# Patient Record
Sex: FEMALE | Race: BLACK OR AFRICAN AMERICAN | Employment: PART TIME | URBAN - METROPOLITAN AREA
[De-identification: names, ages, dates, MRNs, and addresses within clinical notes are randomized per-mention and may not be internally consistent; named-entity substitution may affect disease eponyms.]

---

## 2017-03-16 ENCOUNTER — OFFICE VISIT (OUTPATIENT)
Dept: INTERNAL MEDICINE CLINIC | Age: 32
End: 2017-03-16

## 2017-03-16 VITALS
HEIGHT: 64 IN | HEART RATE: 74 BPM | RESPIRATION RATE: 18 BRPM | BODY MASS INDEX: 19.7 KG/M2 | OXYGEN SATURATION: 97 % | TEMPERATURE: 99.3 F | WEIGHT: 115.4 LBS | SYSTOLIC BLOOD PRESSURE: 101 MMHG | DIASTOLIC BLOOD PRESSURE: 73 MMHG

## 2017-03-16 DIAGNOSIS — N89.8 VAGINAL CYST: ICD-10-CM

## 2017-03-16 DIAGNOSIS — R10.11 RUQ ABDOMINAL PAIN: Primary | ICD-10-CM

## 2017-03-16 DIAGNOSIS — K21.9 GASTROESOPHAGEAL REFLUX DISEASE WITHOUT ESOPHAGITIS: ICD-10-CM

## 2017-03-16 LAB
BILIRUB UR QL STRIP: NEGATIVE
GLUCOSE UR-MCNC: NEGATIVE MG/DL
KETONES P FAST UR STRIP-MCNC: NEGATIVE MG/DL
PH UR STRIP: 7 [PH] (ref 4.6–8)
PROT UR QL STRIP: NORMAL MG/DL
SP GR UR STRIP: 1.02 (ref 1–1.03)
UA UROBILINOGEN AMB POC: NORMAL (ref 0.2–1)
URINALYSIS CLARITY POC: CLEAR
URINALYSIS COLOR POC: YELLOW
URINE BLOOD POC: NORMAL
URINE LEUKOCYTES POC: NORMAL
URINE NITRITES POC: NEGATIVE

## 2017-03-16 RX ORDER — TRAMADOL HYDROCHLORIDE 50 MG/1
TABLET ORAL
Refills: 0 | COMMUNITY
Start: 2017-03-05 | End: 2017-03-16 | Stop reason: ALTCHOICE

## 2017-03-16 RX ORDER — ACETAMINOPHEN AND CODEINE PHOSPHATE 300; 30 MG/1; MG/1
1 TABLET ORAL
Qty: 15 TAB | Refills: 0 | Status: SHIPPED | OUTPATIENT
Start: 2017-03-16 | End: 2017-04-07

## 2017-03-16 RX ORDER — IBUPROFEN 600 MG/1
TABLET ORAL
COMMUNITY
End: 2017-03-28 | Stop reason: SDUPTHER

## 2017-03-16 RX ORDER — CEPHALEXIN 500 MG/1
500 CAPSULE ORAL 4 TIMES DAILY
COMMUNITY
End: 2017-03-28

## 2017-03-16 RX ORDER — OMEPRAZOLE 20 MG/1
20 CAPSULE, DELAYED RELEASE ORAL DAILY
Qty: 14 CAP | Refills: 0 | Status: SHIPPED | OUTPATIENT
Start: 2017-03-16 | End: 2017-03-28 | Stop reason: SDUPTHER

## 2017-03-16 NOTE — PROGRESS NOTES
1. Have you been to the ER, urgent care clinic since your last visit? Hospitalized since your last visit? Yes When: 3/4/17 [facility] for kidney pain 3/11/17 [facility] Kidney pain    2. Have you seen or consulted any other health care providers outside of the [address] [place] [address] since your last visit? Include any pap smears or colon screening. Yes Reason for visit: [facility] for   Chief Complaint   Patient presents with    New Patient     pt is here for establish care    Referral Request     pt states she would like a referral to GYN    Cyst     pt states that she has a cyst on the inside of the vagina, no pain noted but its concerns her. Pt states pain level is a 5 right rib area. ..

## 2017-03-16 NOTE — MR AVS SNAPSHOT
Visit Information Date & Time Provider Department Dept. Phone Encounter #  
 3/16/2017  3:00 PM Ramirez Rincon NP [address] [facility] [phone] [id number] Follow-up Instructions Return in about 2 weeks (around 3/30/2017) for 2 wk f/u. Upcoming Health Maintenance Date Due  
 PAP AKA CERVICAL CYTOLOGY 2/3/2007 DTaP/Tdap/Td series (2 - Td) 3/16/2027 Allergies as of 3/16/2017  Review Complete On: 3/16/2017 By: Monica Nava. HANNAH Newby Severity Noted Reaction Type Reactions Soap  01/03/2013    Hives Current Immunizations  Reviewed on 2/29/2016 No immunizations on file. Not reviewed this visit You Were Diagnosed With   
  
 Codes Comments RUQ abdominal pain    -  Primary ICD-10-CM: R10.11 ICD-9-CM: 789.01 Vaginal cyst     ICD-10-CM: N89.8 ICD-9-CM: 623.8 Gastroesophageal reflux disease without esophagitis     ICD-10-CM: K21.9 ICD-9-CM: 530.81 Vitals BP Pulse Temp Resp Height(growth percentile) Weight(growth percentile) 101/73 (BP 1 Location: Left arm, BP Patient Position: Sitting) 74 99.3 °F (37.4 °C) (Oral) 18 5' 4\" (1.626 m) 115 lb 6.4 oz (52.3 kg) LMP SpO2 BMI OB Status Smoking Status 02/17/2017 (Exact Date) 97% 19.81 kg/m2 Having regular periods Current Every Day Smoker Vitals History BMI and BSA Data Body Mass Index Body Surface Area  
 19.81 kg/m 2 1.54 m 2 Preferred Pharmacy Pharmacy Name Phone Braulio Allen [address] , [address] [facility] [phone] Your Updated Medication List  
  
   
This list is accurate as of: 3/16/17  4:01 PM.  Always use your most recent med list.  
  
  
  
  
 acetaminophen-codeine 300-30 mg per tablet Commonly known as:  TYLENOL #3 Take 1 Tab by mouth every six (6) hours as needed for Pain. Max Daily Amount: 4 Tabs. cephALEXin 500 mg capsule Commonly known as:  Gato Panning Take 500 mg by mouth four (4) times daily. ibuprofen 600 mg tablet Commonly known as:  MOTRIN Take  by mouth every six (6) hours as needed for Pain. omeprazole 20 mg capsule Commonly known as:  PRILOSEC Take 1 Cap by mouth daily. Prescriptions Printed Refills  
 acetaminophen-codeine (TYLENOL #3) 300-30 mg per tablet 0 Sig: Take 1 Tab by mouth every six (6) hours as needed for Pain. Max Daily Amount: 4 Tabs. Class: Print Route: Oral  
  
Prescriptions Sent to Pharmacy Refills  
 omeprazole (PRILOSEC) 20 mg capsule 0 Sig: Take 1 Cap by mouth daily. Class: Normal  
 Pharmacy: [facility] [address] [place] [address], [address] [place] [address] #: [phone] Route: Oral  
  
We Performed the Following AMB POC URINALYSIS DIP STICK AUTO W/O MICRO [73518 CPT(R)] REFERRAL TO GASTROENTEROLOGY [KDD96 Custom] Comments:  
 Please evaluate patient for RUQ pain, wt loss, nausea, and decreased appetite. REFERRAL TO GYNECOLOGY [REF30 Custom] Follow-up Instructions Return in about 2 weeks (around 3/30/2017) for 2 wk f/u. To-Do List   
 03/16/2017 Imaging:  CT ABD PELV W CONT Referral Information Referral ID Referred By Referred To  
  
 [id number] NIKOS Augustin MD   
   [address] Phone: [phone] Fax: [phone] Visits Status Start Date End Date 1 New Request 3/16/17 3/16/18 If your referral has a status of pending review or denied, additional information will be sent to support the outcome of this decision. Referral ID Referred By Referred To  
 [id number] Anoop Elizabeth I Not Available Visits Status Start Date End Date 1 New Request 3/16/17 3/16/18 If your referral has a status of pending review or denied, additional information will be sent to support the outcome of this decision. Referral ID Referred By Referred To [id number] [facility] [address] [place] [phone] Emily Bains6 [employer] Visits Status Start Date End Date 1 New Request 3/16/17 3/16/18 If your referral has a status of pending review or denied, additional information will be sent to support the outcome of this decision. Patient Instructions Learning About Benefits From Quitting Smoking How does quitting smoking make you healthier? If you're thinking about quitting smoking, you may have a few reasons to be smoke-free. Your health may be one of them. · When you quit smoking, you lower your risks for cancer, lung disease, heart attack, stroke, blood vessel disease, and blindness from macular degeneration. · When you're smoke-free, you get sick less often, and you heal faster. You are less likely to get colds, flu, bronchitis, and pneumonia. · As a nonsmoker, you may find that your mood is better and you are less stressed. When and how will you feel healthier? Quitting has real health benefits that start from day 1 of being smoke-free. And the longer you stay smoke-free, the healthier you get and the better you feel. The first hours · After just 20 minutes, your blood pressure and heart rate go down. That means there's less stress on your heart and blood vessels. · Within 12 hours, the level of carbon monoxide in your blood drops back to normal. That makes room for more oxygen. With more oxygen in your body, you may notice that you have more energy than when you smoked. After 2 weeks · Your lungs start to work better. · Your risk of heart attack starts to drop. After 1 month · When your lungs are clear, you cough less and breathe deeper, so it's easier to be active. · Your sense of taste and smell return. That means you can enjoy food more than you have since you started smoking. Over the years · After 1 year, your risk of heart disease is half what it would be if you kept smoking. · After 5 years, your risk of stroke starts to shrink. Within a few years after that, it's about the same as if you'd never smoked. · After 10 years, your risk of dying from lung cancer is cut by about half. And your risk for many other types of cancer is lower too. How would quitting help others in your life? When you quit smoking, you improve the health of everyone who now breathes in your smoke. · Their heart, lung, and cancer risks drop, much like yours. · They are sick less. For babies and small children, living smoke-free means they're less likely to have ear infections, pneumonia, and bronchitis. · If you're a woman who is or will be pregnant someday, quitting smoking means a healthier . · Children who are close to you are less likely to become adult smokers. Where can you learn more? Go to http://rl99times.cnlavon.info/. Enter [id number] [id number] [id number] 11 in the search box to learn more about \"Learning About Benefits From Quitting Smoking. \" Current as of: May 26, 2016 Content Version: 11.1 © 9551-5300 [employer]. Care instructions adapted under license by [employer] (which disclaims liability or warranty for this information). If you have questions about a medical condition or this instruction, always ask your healthcare professional. [place] [address] any warranty or liability for your use of this information. Learning About Benefits From Quitting Smoking How does quitting smoking make you healthier? If you're thinking about quitting smoking, you may have a few reasons to be smoke-free. Your health may be one of them. · When you quit smoking, you lower your risks for cancer, lung disease, heart attack, stroke, blood vessel disease, and blindness from macular degeneration. · When you're smoke-free, you get sick less often, and you heal faster. You are less likely to get colds, flu, bronchitis, and pneumonia. · As a nonsmoker, you may find that your mood is better and you are less stressed. When and how will you feel healthier? Quitting has real health benefits that start from day 1 of being smoke-free. And the longer you stay smoke-free, the healthier you get and the better you feel. The first hours · After just 20 minutes, your blood pressure and heart rate go down. That means there's less stress on your heart and blood vessels. · Within 12 hours, the level of carbon monoxide in your blood drops back to normal. That makes room for more oxygen. With more oxygen in your body, you may notice that you have more energy than when you smoked. After 2 weeks · Your lungs start to work better. · Your risk of heart attack starts to drop. After 1 month · When your lungs are clear, you cough less and breathe deeper, so it's easier to be active. · Your sense of taste and smell return. That means you can enjoy food more than you have since you started smoking. Over the years · After 1 year, your risk of heart disease is half what it would be if you kept smoking. · After 5 years, your risk of stroke starts to shrink. Within a few years after that, it's about the same as if you'd never smoked. · After 10 years, your risk of dying from lung cancer is cut by about half. And your risk for many other types of cancer is lower too. How would quitting help others in your life? When you quit smoking, you improve the health of everyone who now breathes in your smoke. · Their heart, lung, and cancer risks drop, much like yours. · They are sick less. For babies and small children, living smoke-free means they're less likely to have ear infections, pneumonia, and bronchitis. · If you're a woman who is or will be pregnant someday, quitting smoking means a healthier . · Children who are close to you are less likely to become adult smokers. Where can you learn more? Go to http://rl-lavon.info/. Enter [id number] in the search box to learn more about \"Learning About Benefits From Quitting Smoking. \" Current as of: May 26, 2016 Content Version: 11.1 © 8753-2933 [employer]. Care instructions adapted under license by [employer] (which disclaims liability or warranty for this information). If you have questions about a medical condition or this instruction, always ask your healthcare professional. [place] [address] any warranty or liability for your use of this information. Stopping Smoking: Care Instructions Your Care Instructions Cigarette smokers crave the nicotine in cigarettes. Giving it up is much harder than simply changing a habit. Your body has to stop craving the nicotine. It is hard to quit, but you can do it. There are many tools that people use to quit smoking. You may find that combining tools works best for you. There are several steps to quitting. First you get ready to quit. Then you get support to help you. After that, you learn new skills and behaviors to become a nonsmoker. For many people, a necessary step is getting and using medicine. Your doctor will help you set up the plan that best meets your needs. You may want to attend a smoking cessation program to help you quit smoking. When you choose a program, look for one that has proven success. Ask your doctor for ideas. You will greatly increase your chances of success if you take medicine as well as get counseling or join a cessation program. 
Some of the changes you feel when you first quit tobacco are uncomfortable. Your body will miss the nicotine at first, and you may feel short-tempered and grumpy. You may have trouble sleeping or concentrating. Medicine can help you deal with these symptoms. You may struggle with changing your smoking habits and rituals. The last step is the tricky one: Be prepared for the smoking urge to continue for a time.  This is a lot to deal with, but keep at it. You will feel better. Follow-up care is a key part of your treatment and safety. Be sure to make and go to all appointments, and call your doctor if you are having problems. Its also a good idea to know your test results and keep a list of the medicines you take. How can you care for yourself at home? · Ask your family, friends, and coworkers for support. You have a better chance of quitting if you have help and support. · Join a support group, such as Nicotine Anonymous, for people who are trying to quit smoking. · Consider signing up for a smoking cessation program, such as the American Lung Association's Freedom from Smoking program. 
· Set a quit date. Pick your date carefully so that it is not right in the middle of a big deadline or stressful time. Once you quit, do not even take a puff. Get rid of all ashtrays and lighters after your last cigarette. Clean your house and your clothes so that they do not smell of smoke. · Learn how to be a nonsmoker. Think about ways you can avoid those things that make you reach for a cigarette. ¨ Avoid situations that put you at greatest risk for smoking. For some people, it is hard to have a drink with friends without smoking. For others, they might skip a coffee break with coworkers who smoke. ¨ Change your daily routine. Take a different route to work or eat a meal in a different place. · Cut down on stress. Calm yourself or release tension by doing an activity you enjoy, such as reading a book, taking a hot bath, or gardening. · Talk to your doctor or pharmacist about nicotine replacement therapy, which replaces the nicotine in your body. You still get nicotine but you do not use tobacco. Nicotine replacement products help you slowly reduce the amount of nicotine you need. These products come in several forms, many of them available over-the-counter: ¨ Nicotine patches ¨ Nicotine gum and lozenges ¨ Nicotine inhaler · Ask your doctor about bupropion (Wellbutrin) or varenicline (Chantix), which are prescription medicines. They do not contain nicotine. They help you by reducing withdrawal symptoms, such as stress and anxiety. · Some people find hypnosis, acupuncture, and massage helpful for ending the smoking habit. · Eat a healthy diet and get regular exercise. Having healthy habits will help your body move past its craving for nicotine. · Be prepared to keep trying. Most people are not successful the first few times they try to quit. Do not get mad at yourself if you smoke again. Make a list of things you learned and think about when you want to try again, such as next week, next month, or next year. Where can you learn more? Go to http://rlGetablelavon.info/. Enter Z977 in the search box to learn more about \"Stopping Smoking: Care Instructions. \" Current as of: May 26, 2016 Content Version: 11.1 © 3917-8157 [employer]ooki. Care instructions adapted under license by [employer] (which disclaims liability or warranty for this information). If you have questions about a medical condition or this instruction, always ask your healthcare professional. [address] any warranty or liability for your use of this information. Deciding About Using Medicines To Quit Smoking What are the medicines you can use? Your doctor may prescribe varenicline (Chantix) or bupropion (Zyban). These medicines can help you cope with cravings for tobacco. They are pills that don't contain nicotine. You also can use nicotine replacement products. These do contain nicotine. There are many types. · Gum and lozenges slowly release nicotine into your mouth. · Patches stick to your skin. They slowly release nicotine into your bloodstream. 
· An inhaler has a shaw that contains nicotine. You breathe in a puff of nicotine vapor through your mouth and throat. · Nasal spray releases a mist that contains nicotine. What are key points about this decision? · Using medicines can double your chances of quitting smoking. They can ease cravings and withdrawal symptoms. · Getting counseling along with using medicine can raise your chances of quitting even more. · If you smoke fewer than 5 cigarettes a day, you may not need medicines to help you quit smoking. · These medicines have less nicotine than cigarettes. And by itself, nicotine is not nearly as harmful as smoking. The tars, carbon monoxide, and other toxic chemicals in tobacco cause the harmful effects. · The side effects of nicotine replacement products depend on the type of product. For example, a patch can make your skin red and itchy. Medicines in pill form can make you sick to your stomach. They can also cause dry mouth and trouble sleeping. For most people, the side effects are not bad enough to make them stop using the products. · FDA warning. The U.S. Food and Drug Administration (FDA) warns that people who are taking bupropion or varenicline and who have any serious or unusual changes in mood or behavior or who feel like hurting themselves or someone else should stop taking the medicine and call a doctor right away. If you already have a mood or behavior problem, be sure to tell your doctor before you decide to use these medicines. Why might you choose to use medicines to quit smoking? · You have tried on your own to stop smoking, but you were not able to stop. · You smoke more than 5 cigarettes a day. · You want to increase your chances of quitting smoking. · You want to reduce your cravings and withdrawal symptoms. · You feel the benefits of medicine outweigh the side effects. Why might you choose not to use medicine? · You want to try quitting on your own by stopping all at once (\"cold turkey\"). · You want to cut back slowly on the number of cigarettes you smoke. · You smoke fewer than 5 cigarettes a day. · You do not like using medicine. · You feel the side effects of medicines outweigh the benefits. · You are worried about the cost of medicines. Your decision Thinking about the facts and your feelings can help you make a decision that is right for you. Be sure you understand the benefits and risks of your options, and think about what else you need to do before you make the decision. Where can you learn more? Go to http://rl-lavon.info/. Enter C861 in the search box to learn more about \"Deciding About Using Medicines To Quit Smoking. \" Current as of: May 26, 2016 Content Version: 11.1 © 6807-9469 [employer]. Care instructions adapted under license by [employer] (which disclaims liability or warranty for this information). If you have questions about a medical condition or this instruction, always ask your healthcare professional. [place] [address] any warranty or liability for your use of this information. HIDA Scan: About This Test 
What is it? A HIDA scan is an imaging test that checks how your gallbladder is working. The gallbladder is a small sac under your liver. It stores bile, a fluid that helps your body digest fats. If there are problems with the gallbladder, such as gallstones, the gallbladder may not store or empty bile properly. During a HIDA scan, a camera takes pictures of your gallbladder after a radioactive tracer is injected into a vein in your arm. The tracer travels through your liver, gallbladder, bile ducts, and small intestine. The camera takes a series of pictures of the tracer as it moves along. Your doctor can use these pictures to look for leaks, blockages, or any other problems. Why is this test done? The HIDA scan may be done to: 
· Help find the cause of pain in the upper belly, especially if the pain is on the right side. · Find out if bile is leaking. · Find anything that may be blocking the bile ducts. A HIDA scan is sometimes done if an earlier ultrasound test did not give enough information. How can you prepare for the test? 
· Before the HIDA scan, tell your doctor if: 
Sanjay Barajas You are or might be pregnant. ¨ You are breastfeeding. Do not breastfeed your baby for 2 days after this test. During this time, you can give your baby breast milk you stored before the test, or you can give formula. Discard the breast milk you pump for 2 days after the test. 
Areatha Mealing taking certain medicines like morphine for pain. ¨ Within the past 4 days, you've had an X-ray test that used barium. · The doctor may tell you not to eat or drink anything but water for 4 to 6 hours before the test. Follow all instructions carefully. If you haven't eaten for more than 24 hours before the test, tell your doctor. What happens during the test? 
· You will remove any clothing around your belly. You will be given a gown or paper covering to use during the test. 
· You will lie on your back on a table. · A thin tube, call an IV, will be put into a vein in your arm. · A radioactive tracer chemical will be injected into the IV. A medicine that stimulates your gallbladder may also be injected. · The scanning camera will be placed close over your belly. · A picture will be taken right away. The whole scan may last up to 60 minutes as the tracer passes through your liver and into your gallbladder and small intestine. Several more pictures, each lasting a few minutes, may be taken over the next 2 to 4 hours. Each picture will take only a few minutes, but you will have to lie still for the whole test. 
What else should you know about the test? 
· The HIDA scan itself is painless, but you may feel a brief sting or pinch as the IV is placed in your arm. · You may feel a brief pain in your belly as the medicine that stimulates your gallbladder starts to work. · The amount of radiation in the tracer chemical is very small. It is generally not harmful to health, and it's not a risk to people who touch you after the test. But there is a slight risk of damage to cells or tissue from being exposed to any radiation. The camera itself does not produce any radiation. What happens after the test? 
· You will probably be able to go home right away. · Most of the tracer will leave your body within 24 hours through your urine and stool. When you go to the bathroom during that time, be sure to flush the toilet and wash your hands well with soap and water. · Your doctor will discuss the results of the test with you. · You can go back to your usual activities right away. · If you are breastfeeding, you will need to use saved breast milk or formula for 2 days after the test. This is so you won't pass the tracer to your baby. Follow-up care is a key part of your treatment and safety. Be sure to make and go to all appointments, and call your doctor if you are having problems. It's also a good idea to keep a list of the medicines you take. Ask your doctor when you can expect to have your test results. Where can you learn more? Go to http://rl-lavon.info/. Enter F573 in the search box to learn more about \"HIDA Scan: About This Test.\" Current as of: August 9, 2016 Content Version: 11.1 © 6048-8941 Dash. Care instructions adapted under license by [employer] (which disclaims liability or warranty for this information). If you have questions about a medical condition or this instruction, always ask your healthcare professional. [address] any warranty or liability for your use of this information. CT Scan of the Abdomen: About This Test 
What is it? A CT (computed tomography) scan uses X-rays to make detailed pictures of your body and structures inside your body.  A CT scan of the abdomen (belly) can give your doctor information about your liver, pancreas, kidneys, and other structures in your belly. During the test, you will lie on a table that is attached to the [employer]. The CT scanner is a large doughnut-shaped machine. Why is this test done? A CT scan of the belly can help find problems such as kidney stones, infected pouches in the colon (diverticulitis), and appendicitis. It also helps find tumors and abscesses. How can you prepare for the test? 
Talk to your doctor about all your health conditions before the test. For example, tell your doctor if: 
· You are or might be pregnant. · You are allergic to any medicines. · You have diabetes. · You take metformin. · You are breastfeeding. · You get nervous in confined spaces. You may need medicine to help you relax. · You have had an X-ray test using barium contrast material in the past 4 days. You may be asked to not eat any solid foods starting the night before your scan. What happens before the test? 
· You may have to take off jewelry. · You will take off all or most of your clothes and change into a gown. If you do leave some clothes on, make sure you take everything out of your pockets. · You may have contrast material (dye) put into your arm through a tube called an IV. Or, you may drink the contrast material or it may be put through a tube into your bladder or rectum. Contrast material helps doctors see specific organs, blood vessels, and most tumors. What happens during the test? 
· You will lie on a table that is attached to the CT scanner. · The table slides into the round opening of the scanner. The table will move during the scan. The scanner moves inside the doughnut-shaped casing around your body. · You will be asked to hold still during the scan. You may be asked to hold your breath for short periods.  
· You may be alone in the scanning room, but a technologist will be watching you through a window and talking with you during the test. 
What else should you know about the test? 
· A CT scan does not hurt. · If a dye is used, you may feel a quick sting or pinch when the IV is started. The dye may make you feel warm and flushed and give you a metallic taste in your mouth. Some people feel sick to their stomach or get a headache. · If you breastfeed and are concerned about whether the dye used in this test is safe, talk to your doctor. Most experts believe that very little dye passes into breast milk and even less is passed on to the baby. But if you prefer, you can store some of your breast milk ahead of time and use it for a day or two after the test. 
· The dose of radiation from a CT scanner may be higher than that from other X-ray tests. If you are concerned about the radiation risk, talk to your doctor. How long does the test take? · The test will take about 30 to 60 minutes. Most of this time is spent getting ready for the scan. The actual test only takes a few minutes. What happens after the test? 
· You will probably be able to go home right away. · You can go back to your usual activities right away. · Drink plenty of fluids for 24 hours after the test if dye was used, unless your doctor tells you not to. When should you call for help? Watch closely for changes in your health, and be sure to contact your doctor if you have any problems. Follow-up care is a key part of your treatment and safety. Be sure to make and go to all appointments, and call your doctor if you are having problems. It's also a good idea to keep a list of the medicines you take. Ask your doctor when you can expect to have your test results. Where can you learn more? Go to http://rl-lavon.info/. Enter D072 in the search box to learn more about \"CT Scan of the Abdomen: About This Test.\" Current as of: February 19, 2016 Content Version: 11.1 © 6745-5785 [employer]. Care instructions adapted under license by [employer] (which disclaims liability or warranty for this information). If you have questions about a medical condition or this instruction, always ask your healthcare professional. [address] any warranty or liability for your use of this information. Gastroesophageal Reflux Disease (GERD): Care Instructions Your Care Instructions Gastroesophageal reflux disease (GERD) is the backward flow of stomach acid into the esophagus. The esophagus is the tube that leads from your throat to your stomach. A one-way valve prevents the stomach acid from moving up into this tube. When you have GERD, this valve does not close tightly enough. If you have mild GERD symptoms including heartburn, you may be able to control the problem with antacids or over-the-counter medicine. Changing your diet, losing weight, and making other lifestyle changes can also help reduce symptoms. Follow-up care is a key part of your treatment and safety. Be sure to make and go to all appointments, and call your doctor if you are having problems. Its also a good idea to know your test results and keep a list of the medicines you take. How can you care for yourself at home? · Take your medicines exactly as prescribed. Call your doctor if you think you are having a problem with your medicine. · Your doctor may recommend over-the-counter medicine. For mild or occasional indigestion, antacids, such as Tums, Gaviscon, Mylanta, or Maalox, may help. Your doctor also may recommend over-the-counter acid reducers, such as Pepcid AC, Tagamet HB, Zantac 75, or Prilosec. Read and follow all instructions on the label. If you use these medicines often, talk with your doctor. · Change your eating habits. ¨ Its best to eat several small meals instead of two or three large meals. ¨ After you eat, wait 2 to 3 hours before you lie down. ¨ Chocolate, mint, and alcohol can make GERD worse. ¨ Spicy foods, foods that have a lot of acid (like tomatoes and oranges), and coffee can make GERD symptoms worse in some people. If your symptoms are worse after you eat a certain food, you may want to stop eating that food to see if your symptoms get better. · Do not smoke or chew tobacco. Smoking can make GERD worse. If you need help quitting, talk to your doctor about stop-smoking programs and medicines. These can increase your chances of quitting for good. · If you have GERD symptoms at night, raise the head of your bed 6 to 8 inches by putting the frame on blocks or placing a foam wedge under the head of your mattress. (Adding extra pillows does not work.) · Do not wear tight clothing around your middle. · Lose weight if you need to. Losing just 5 to 10 pounds can help. When should you call for help? Call your doctor now or seek immediate medical care if: 
· You have new or different belly pain. · Your stools are black and tarlike or have streaks of blood. Watch closely for changes in your health, and be sure to contact your doctor if: 
· Your symptoms have not improved after 2 days. · Food seems to catch in your throat or chest. 
Where can you learn more? Go to http://rl-lavon.info/. Enter N211 in the search box to learn more about \"Gastroesophageal Reflux Disease (GERD): Care Instructions. \" Current as of: August 9, 2016 Content Version: 11.1 © 1227-3391 [employer]. Care instructions adapted under license by [employer] (which disclaims liability or warranty for this information). If you have questions about a medical condition or this instruction, always ask your healthcare professional. [address] any warranty or liability for your use of this information. Introducing [facility]!    
 [facility] introduces [employer] patient portal. Now you can access parts of your medical record, email your doctor's office, and request medication refills online. 1. In your internet browser, go to [email or web address] 2. Click on the First Time User? Click Here link in the Sign In box. You will see the New Member Sign Up page. 3. Enter your [employer] Access Code exactly as it appears below. You will not need to use this code after youve completed the sign-up process. If you do not sign up before the expiration date, you must request a new code. · MyChart Access Code: [facility] Expires: 6/14/2017  3:11 PM 
 
4. Enter the last four digits of your Social Security Number (xxxx) and Date of Birth (mm/dd/yyyy) as indicated and click Submit. You will be taken to the next sign-up page. 5. Create a [employer] ID. This will be your [employer] login ID and cannot be changed, so think of one that is secure and easy to remember. 6. Create a [employer] password. You can change your password at any time. 7. Enter your Password Reset Question and Answer. This can be used at a later time if you forget your password. 8. Enter your e-mail address. You will receive e-mail notification when new information is available in [address] 9. Click Sign Up. You can now view and download portions of your medical record. 10. Click the Download Summary menu link to download a portable copy of your medical information. If you have questions, please visit the Frequently Asked Questions section of the [employer] website. Remember, [employer] is NOT to be used for urgent needs. For medical emergencies, dial 911. Now available from your iPhone and Android! Please provide this summary of care documentation to your next provider. Your primary care clinician is listed as NIKOS Ashley. If you have any questions after today's visit, please call [phone].

## 2017-03-16 NOTE — PATIENT INSTRUCTIONS
Learning About Benefits From Quitting Smoking  How does quitting smoking make you healthier? If you're thinking about quitting smoking, you may have a few reasons to be smoke-free. Your health may be one of them. · When you quit smoking, you lower your risks for cancer, lung disease, heart attack, stroke, blood vessel disease, and blindness from macular degeneration. · When you're smoke-free, you get sick less often, and you heal faster. You are less likely to get colds, flu, bronchitis, and pneumonia. · As a nonsmoker, you may find that your mood is better and you are less stressed. When and how will you feel healthier? Quitting has real health benefits that start from day 1 of being smoke-free. And the longer you stay smoke-free, the healthier you get and the better you feel. The first hours  · After just 20 minutes, your blood pressure and heart rate go down. That means there's less stress on your heart and blood vessels. · Within 12 hours, the level of carbon monoxide in your blood drops back to normal. That makes room for more oxygen. With more oxygen in your body, you may notice that you have more energy than when you smoked. After 2 weeks  · Your lungs start to work better. · Your risk of heart attack starts to drop. After 1 month  · When your lungs are clear, you cough less and breathe deeper, so it's easier to be active. · Your sense of taste and smell return. That means you can enjoy food more than you have since you started smoking. Over the years  · After 1 year, your risk of heart disease is half what it would be if you kept smoking. · After 5 years, your risk of stroke starts to shrink. Within a few years after that, it's about the same as if you'd never smoked. · After 10 years, your risk of dying from lung cancer is cut by about half. And your risk for many other types of cancer is lower too. How would quitting help others in your life?   When you quit smoking, you improve the health of everyone who now breathes in your smoke. · Their heart, lung, and cancer risks drop, much like yours. · They are sick less. For babies and small children, living smoke-free means they're less likely to have ear infections, pneumonia, and bronchitis. · If you're a woman who is or will be pregnant someday, quitting smoking means a healthier . · Children who are close to you are less likely to become adult smokers. Where can you learn more? Go to http://rl-lavon.info/. Enter [id number] [id number] [id number] in the search box to learn more about \"Learning About Benefits From Quitting Smoking. \"  Current as of: May 26, 2016  Content Version: 11.1  © 9967-4823 [employer]. Care instructions adapted under license by [employer] (which disclaims liability or warranty for this information). If you have questions about a medical condition or this instruction, always ask your healthcare professional. [place] [address] any warranty or liability for your use of this information. Learning About Benefits From Quitting Smoking  How does quitting smoking make you healthier? If you're thinking about quitting smoking, you may have a few reasons to be smoke-free. Your health may be one of them. · When you quit smoking, you lower your risks for cancer, lung disease, heart attack, stroke, blood vessel disease, and blindness from macular degeneration. · When you're smoke-free, you get sick less often, and you heal faster. You are less likely to get colds, flu, bronchitis, and pneumonia. · As a nonsmoker, you may find that your mood is better and you are less stressed. When and how will you feel healthier? Quitting has real health benefits that start from day 1 of being smoke-free. And the longer you stay smoke-free, the healthier you get and the better you feel. The first hours  · After just 20 minutes, your blood pressure and heart rate go down.  That means there's less stress on your heart and blood vessels. · Within 12 hours, the level of carbon monoxide in your blood drops back to normal. That makes room for more oxygen. With more oxygen in your body, you may notice that you have more energy than when you smoked. After 2 weeks  · Your lungs start to work better. · Your risk of heart attack starts to drop. After 1 month  · When your lungs are clear, you cough less and breathe deeper, so it's easier to be active. · Your sense of taste and smell return. That means you can enjoy food more than you have since you started smoking. Over the years  · After 1 year, your risk of heart disease is half what it would be if you kept smoking. · After 5 years, your risk of stroke starts to shrink. Within a few years after that, it's about the same as if you'd never smoked. · After 10 years, your risk of dying from lung cancer is cut by about half. And your risk for many other types of cancer is lower too. How would quitting help others in your life? When you quit smoking, you improve the health of everyone who now breathes in your smoke. · Their heart, lung, and cancer risks drop, much like yours. · They are sick less. For babies and small children, living smoke-free means they're less likely to have ear infections, pneumonia, and bronchitis. · If you're a woman who is or will be pregnant someday, quitting smoking means a healthier . · Children who are close to you are less likely to become adult smokers. Where can you learn more? Go to http://rl-lavon.info/. Enter [id number] [id number] [id number] in the search box to learn more about \"Learning About Benefits From Quitting Smoking. \"  Current as of: May 26, 2016  Content Version: 11.1  © 9787-7800 [employer]. Care instructions adapted under license by [employer] (which disclaims liability or warranty for this information).  If you have questions about a medical condition or this instruction, always ask your healthcare professional. [address] any warranty or liability for your use of this information. Stopping Smoking: Care Instructions  Your Care Instructions  Cigarette smokers crave the nicotine in cigarettes. Giving it up is much harder than simply changing a habit. Your body has to stop craving the nicotine. It is hard to quit, but you can do it. There are many tools that people use to quit smoking. You may find that combining tools works best for you. There are several steps to quitting. First you get ready to quit. Then you get support to help you. After that, you learn new skills and behaviors to become a nonsmoker. For many people, a necessary step is getting and using medicine. Your doctor will help you set up the plan that best meets your needs. You may want to attend a smoking cessation program to help you quit smoking. When you choose a program, look for one that has proven success. Ask your doctor for ideas. You will greatly increase your chances of success if you take medicine as well as get counseling or join a cessation program.  Some of the changes you feel when you first quit tobacco are uncomfortable. Your body will miss the nicotine at first, and you may feel short-tempered and grumpy. You may have trouble sleeping or concentrating. Medicine can help you deal with these symptoms. You may struggle with changing your smoking habits and rituals. The last step is the tricky one: Be prepared for the smoking urge to continue for a time. This is a lot to deal with, but keep at it. You will feel better. Follow-up care is a key part of your treatment and safety. Be sure to make and go to all appointments, and call your doctor if you are having problems. Its also a good idea to know your test results and keep a list of the medicines you take. How can you care for yourself at home? · Ask your family, friends, and coworkers for support.  You have a better chance of quitting if you have help and support. · Join a support group, such as Nicotine Anonymous, for people who are trying to quit smoking. · Consider signing up for a smoking cessation program, such as the American Lung Association's Freedom from Smoking program.  · Set a quit date. Pick your date carefully so that it is not right in the middle of a big deadline or stressful time. Once you quit, do not even take a puff. Get rid of all ashtrays and lighters after your last cigarette. Clean your house and your clothes so that they do not smell of smoke. · Learn how to be a nonsmoker. Think about ways you can avoid those things that make you reach for a cigarette. ¨ Avoid situations that put you at greatest risk for smoking. For some people, it is hard to have a drink with friends without smoking. For others, they might skip a coffee break with coworkers who smoke. ¨ Change your daily routine. Take a different route to work or eat a meal in a different place. · Cut down on stress. Calm yourself or release tension by doing an activity you enjoy, such as reading a book, taking a hot bath, or gardening. · Talk to your doctor or pharmacist about nicotine replacement therapy, which replaces the nicotine in your body. You still get nicotine but you do not use tobacco. Nicotine replacement products help you slowly reduce the amount of nicotine you need. These products come in several forms, many of them available over-the-counter:  ¨ Nicotine patches  ¨ Nicotine gum and lozenges  ¨ Nicotine inhaler  · Ask your doctor about bupropion (Wellbutrin) or varenicline (Chantix), which are prescription medicines. They do not contain nicotine. They help you by reducing withdrawal symptoms, such as stress and anxiety. · Some people find hypnosis, acupuncture, and massage helpful for ending the smoking habit. · Eat a healthy diet and get regular exercise.  Having healthy habits will help your body move past its craving for nicotine. · Be prepared to keep trying. Most people are not successful the first few times they try to quit. Do not get mad at yourself if you smoke again. Make a list of things you learned and think about when you want to try again, such as next week, next month, or next year. Where can you learn more? Go to http://rl-lavon.info/. Enter I760 in the search box to learn more about \"Stopping Smoking: Care Instructions. \"  Current as of: May 26, 2016  Content Version: 11.1  © 0583-6507 [employer]. Care instructions adapted under license by [employer] (which disclaims liability or warranty for this information). If you have questions about a medical condition or this instruction, always ask your healthcare professional. [address] any warranty or liability for your use of this information. Deciding About Using Medicines To Quit Smoking  What are the medicines you can use? Your doctor may prescribe varenicline (Chantix) or bupropion (Zyban). These medicines can help you cope with cravings for tobacco. They are pills that don't contain nicotine. You also can use nicotine replacement products. These do contain nicotine. There are many types. · Gum and lozenges slowly release nicotine into your mouth. · Patches stick to your skin. They slowly release nicotine into your bloodstream.  · An inhaler has a shaw that contains nicotine. You breathe in a puff of nicotine vapor through your mouth and throat. · Nasal spray releases a mist that contains nicotine. What are key points about this decision? · Using medicines can double your chances of quitting smoking. They can ease cravings and withdrawal symptoms. · Getting counseling along with using medicine can raise your chances of quitting even more. · If you smoke fewer than 5 cigarettes a day, you may not need medicines to help you quit smoking.   · These medicines have less nicotine than cigarettes. And by itself, nicotine is not nearly as harmful as smoking. The tars, carbon monoxide, and other toxic chemicals in tobacco cause the harmful effects. · The side effects of nicotine replacement products depend on the type of product. For example, a patch can make your skin red and itchy. Medicines in pill form can make you sick to your stomach. They can also cause dry mouth and trouble sleeping. For most people, the side effects are not bad enough to make them stop using the products. · FDA warning. The U.S. Food and Drug Administration (FDA) warns that people who are taking bupropion or varenicline and who have any serious or unusual changes in mood or behavior or who feel like hurting themselves or someone else should stop taking the medicine and call a doctor right away. If you already have a mood or behavior problem, be sure to tell your doctor before you decide to use these medicines. Why might you choose to use medicines to quit smoking? · You have tried on your own to stop smoking, but you were not able to stop. · You smoke more than 5 cigarettes a day. · You want to increase your chances of quitting smoking. · You want to reduce your cravings and withdrawal symptoms. · You feel the benefits of medicine outweigh the side effects. Why might you choose not to use medicine? · You want to try quitting on your own by stopping all at once (\"cold turkey\"). · You want to cut back slowly on the number of cigarettes you smoke. · You smoke fewer than 5 cigarettes a day. · You do not like using medicine. · You feel the side effects of medicines outweigh the benefits. · You are worried about the cost of medicines. Your decision  Thinking about the facts and your feelings can help you make a decision that is right for you. Be sure you understand the benefits and risks of your options, and think about what else you need to do before you make the decision. Where can you learn more?   Go to http://rl-lavon.info/. Enter V427 in the search box to learn more about \"Deciding About Using Medicines To Quit Smoking. \"  Current as of: May 26, 2016  Content Version: 11.1  © 2006-2016 [employer] Care instructions adapted under license by [employer] (which disclaims liability or warranty for this information). If you have questions about a medical condition or this instruction, always ask your healthcare professional. [address] any warranty or liability for your use of this information. HIDA Scan: About This Test  What is it? A HIDA scan is an imaging test that checks how your gallbladder is working. The gallbladder is a small sac under your liver. It stores bile, a fluid that helps your body digest fats. If there are problems with the gallbladder, such as gallstones, the gallbladder may not store or empty bile properly. During a HIDA scan, a camera takes pictures of your gallbladder after a radioactive tracer is injected into a vein in your arm. The tracer travels through your liver, gallbladder, bile ducts, and small intestine. The camera takes a series of pictures of the tracer as it moves along. Your doctor can use these pictures to look for leaks, blockages, or any other problems. Why is this test done? The HIDA scan may be done to:  · Help find the cause of pain in the upper belly, especially if the pain is on the right side. · Find out if bile is leaking. · Find anything that may be blocking the bile ducts. A HIDA scan is sometimes done if an earlier ultrasound test did not give enough information. How can you prepare for the test?  · Before the HIDA scan, tell your doctor if:  Dewayne Mckeon You are or might be pregnant. ¨ You are breastfeeding. Do not breastfeed your baby for 2 days after this test. During this time, you can give your baby breast milk you stored before the test, or you can give formula.  Discard the breast milk you pump for 2 days after the test.  Sherrie Antony taking certain medicines like morphine for pain. ¨ Within the past 4 days, you've had an X-ray test that used barium. · The doctor may tell you not to eat or drink anything but water for 4 to 6 hours before the test. Follow all instructions carefully. If you haven't eaten for more than 24 hours before the test, tell your doctor. What happens during the test?  · You will remove any clothing around your belly. You will be given a gown or paper covering to use during the test.  · You will lie on your back on a table. · A thin tube, call an IV, will be put into a vein in your arm. · A radioactive tracer chemical will be injected into the IV. A medicine that stimulates your gallbladder may also be injected. · The scanning camera will be placed close over your belly. · A picture will be taken right away. The whole scan may last up to 60 minutes as the tracer passes through your liver and into your gallbladder and small intestine. Several more pictures, each lasting a few minutes, may be taken over the next 2 to 4 hours. Each picture will take only a few minutes, but you will have to lie still for the whole test.  What else should you know about the test?  · The HIDA scan itself is painless, but you may feel a brief sting or pinch as the IV is placed in your arm. · You may feel a brief pain in your belly as the medicine that stimulates your gallbladder starts to work. · The amount of radiation in the tracer chemical is very small. It is generally not harmful to health, and it's not a risk to people who touch you after the test. But there is a slight risk of damage to cells or tissue from being exposed to any radiation. The camera itself does not produce any radiation. What happens after the test?  · You will probably be able to go home right away. · Most of the tracer will leave your body within 24 hours through your urine and stool.  When you go to the bathroom during that time, be sure to flush the toilet and wash your hands well with soap and water. · Your doctor will discuss the results of the test with you. · You can go back to your usual activities right away. · If you are breastfeeding, you will need to use saved breast milk or formula for 2 days after the test. This is so you won't pass the tracer to your baby. Follow-up care is a key part of your treatment and safety. Be sure to make and go to all appointments, and call your doctor if you are having problems. It's also a good idea to keep a list of the medicines you take. Ask your doctor when you can expect to have your test results. Where can you learn more? Go to http://rl-lavon.info/. Enter F573 in the search box to learn more about \"HIDA Scan: About This Test.\"  Current as of: August 9, 2016  Content Version: 11.1  © 9543-3323 [employer]. Care instructions adapted under license by [employer] (which disclaims liability or warranty for this information). If you have questions about a medical condition or this instruction, always ask your healthcare professional. [address] any warranty or liability for your use of this information. CT Scan of the Abdomen: About This Test  What is it? A CT (computed tomography) scan uses X-rays to make detailed pictures of your body and structures inside your body. A CT scan of the abdomen (belly) can give your doctor information about your liver, pancreas, kidneys, and other structures in your belly. During the test, you will lie on a table that is attached to the [employer]. The CT scanner is a large doughnut-shaped machine. Why is this test done? A CT scan of the belly can help find problems such as kidney stones, infected pouches in the colon (diverticulitis), and appendicitis. It also helps find tumors and abscesses.   How can you prepare for the test?  Talk to your doctor about all your health conditions before the test. For example, tell your doctor if:  · You are or might be pregnant. · You are allergic to any medicines. · You have diabetes. · You take metformin. · You are breastfeeding. · You get nervous in confined spaces. You may need medicine to help you relax. · You have had an X-ray test using barium contrast material in the past 4 days. You may be asked to not eat any solid foods starting the night before your scan. What happens before the test?  · You may have to take off jewelry. · You will take off all or most of your clothes and change into a gown. If you do leave some clothes on, make sure you take everything out of your pockets. · You may have contrast material (dye) put into your arm through a tube called an IV. Or, you may drink the contrast material or it may be put through a tube into your bladder or rectum. Contrast material helps doctors see specific organs, blood vessels, and most tumors. What happens during the test?  · You will lie on a table that is attached to the CT scanner. · The table slides into the round opening of the scanner. The table will move during the scan. The scanner moves inside the doughnut-shaped casing around your body. · You will be asked to hold still during the scan. You may be asked to hold your breath for short periods. · You may be alone in the scanning room, but a technologist will be watching you through a window and talking with you during the test.  What else should you know about the test?  · A CT scan does not hurt. · If a dye is used, you may feel a quick sting or pinch when the IV is started. The dye may make you feel warm and flushed and give you a metallic taste in your mouth. Some people feel sick to their stomach or get a headache. · If you breastfeed and are concerned about whether the dye used in this test is safe, talk to your doctor.  Most experts believe that very little dye passes into breast milk and even less is passed on to the baby. But if you prefer, you can store some of your breast milk ahead of time and use it for a day or two after the test.  · The dose of radiation from a CT scanner may be higher than that from other X-ray tests. If you are concerned about the radiation risk, talk to your doctor. How long does the test take? · The test will take about 30 to 60 minutes. Most of this time is spent getting ready for the scan. The actual test only takes a few minutes. What happens after the test?  · You will probably be able to go home right away. · You can go back to your usual activities right away. · Drink plenty of fluids for 24 hours after the test if dye was used, unless your doctor tells you not to. When should you call for help? Watch closely for changes in your health, and be sure to contact your doctor if you have any problems. Follow-up care is a key part of your treatment and safety. Be sure to make and go to all appointments, and call your doctor if you are having problems. It's also a good idea to keep a list of the medicines you take. Ask your doctor when you can expect to have your test results. Where can you learn more? Go to http://rl-lavon.info/. Enter O867 in the search box to learn more about \"CT Scan of the Abdomen: About This Test.\"  Current as of: February 19, 2016  Content Version: 11.1  © 2772-7183 [employer]. Care instructions adapted under license by [employer] (which disclaims liability or warranty for this information). If you have questions about a medical condition or this instruction, always ask your healthcare professional. [place] [address] any warranty or liability for your use of this information. Gastroesophageal Reflux Disease (GERD): Care Instructions  Your Care Instructions    Gastroesophageal reflux disease (GERD) is the backward flow of stomach acid into the esophagus.  The esophagus is the tube that leads from your throat to your stomach. A one-way valve prevents the stomach acid from moving up into this tube. When you have GERD, this valve does not close tightly enough. If you have mild GERD symptoms including heartburn, you may be able to control the problem with antacids or over-the-counter medicine. Changing your diet, losing weight, and making other lifestyle changes can also help reduce symptoms. Follow-up care is a key part of your treatment and safety. Be sure to make and go to all appointments, and call your doctor if you are having problems. Its also a good idea to know your test results and keep a list of the medicines you take. How can you care for yourself at home? · Take your medicines exactly as prescribed. Call your doctor if you think you are having a problem with your medicine. · Your doctor may recommend over-the-counter medicine. For mild or occasional indigestion, antacids, such as Tums, Gaviscon, Mylanta, or Maalox, may help. Your doctor also may recommend over-the-counter acid reducers, such as Pepcid AC, Tagamet HB, Zantac 75, or Prilosec. Read and follow all instructions on the label. If you use these medicines often, talk with your doctor. · Change your eating habits. ¨ Its best to eat several small meals instead of two or three large meals. ¨ After you eat, wait 2 to 3 hours before you lie down. ¨ Chocolate, mint, and alcohol can make GERD worse. ¨ Spicy foods, foods that have a lot of acid (like tomatoes and oranges), and coffee can make GERD symptoms worse in some people. If your symptoms are worse after you eat a certain food, you may want to stop eating that food to see if your symptoms get better. · Do not smoke or chew tobacco. Smoking can make GERD worse. If you need help quitting, talk to your doctor about stop-smoking programs and medicines. These can increase your chances of quitting for good.   · If you have GERD symptoms at night, raise the head of your bed 6 to 8 inches by putting the frame on blocks or placing a foam wedge under the head of your mattress. (Adding extra pillows does not work.)  · Do not wear tight clothing around your middle. · Lose weight if you need to. Losing just 5 to 10 pounds can help. When should you call for help? Call your doctor now or seek immediate medical care if:  · You have new or different belly pain. · Your stools are black and tarlike or have streaks of blood. Watch closely for changes in your health, and be sure to contact your doctor if:  · Your symptoms have not improved after 2 days. · Food seems to catch in your throat or chest.  Where can you learn more? Go to http://rl-lavon.info/. Enter E391 in the search box to learn more about \"Gastroesophageal Reflux Disease (GERD): Care Instructions. \"  Current as of: August 9, 2016  Content Version: 11.1  © 9763-0875 Healthwise, Incorporated. Care instructions adapted under license by [employer] (which disclaims liability or warranty for this information). If you have questions about a medical condition or this instruction, always ask your healthcare professional. [address] any warranty or liability for your use of this information.

## 2017-03-16 NOTE — PROGRESS NOTES
Yoana Lockett is a 33 y.o. female and presents with New Patient (pt is here for establish care); Referral Request (pt states she would like a referral to GYN); and Cyst (pt states that she has a cyst on the inside of the vagina, no pain noted but its concerns her.)    Subjective:  Pt here to establish care. Would like referral to GYN for vaginal cyst. No longer painful. Seen in ER twice in March with pain in RUQ. Had xray recently, normal. Diagnosed with early UTI, recently started on Keflex. Tried tramadol with drowsiness noted and little pain relief. Associated with back pain, right shoulder pain, decreased appetite, nausea, and wt loss. Also with emesis and constipation. LBM today with pellets. Review of Systems  Constitutional: + smoker, intertested in quitting, but not with pills at this time. negative for fevers, chills, anorexia and weight loss  Respiratory:  negative for cough, hemoptysis, dyspnea, and wheezing  CV:   negative for chest pain, palpitations, and lower extremity edema  Endo:               negative for polyuria,polydipsia,polyphagia, and heat intolerance  Genitourinary: negative for frequency, urgency, dysuria, retention, and hematuria  Integument:  negative for rash, ulcerations, and pruritus  Hematologic:  negative for easy bruising and bleeding  Musculoskel: negative for arthralgias, muscle weakness,and joint pain/swelling  Neurological:  negative for headaches, dizziness, vertigo,and memory/gait problems  Behavl/Psych: negative for feelings of anxiety, depression, suicide, and mood changes    Past Medical History:   Diagnosis Date    Kidney disorder      History reviewed. No pertinent surgical history.   Social History     Social History    Marital status: SINGLE     Spouse name: N/A    Number of children: N/A    Years of education: N/A     Social History Main Topics    Smoking status: Current Every Day Smoker     Types: Cigarettes    Smokeless tobacco: None    Alcohol use No  Drug use: No    Sexual activity: Not Asked     Other Topics Concern    None     Social History Narrative     History reviewed. No pertinent family history. Current Outpatient Prescriptions   Medication Sig Dispense Refill    ibuprofen (MOTRIN) 600 mg tablet Take  by mouth every six (6) hours as needed for Pain.  cephALEXin (KEFLEX) 500 mg capsule Take 500 mg by mouth four (4) times daily.  omeprazole (PRILOSEC) 20 mg capsule Take 1 Cap by mouth daily. 14 Cap 0    acetaminophen-codeine (TYLENOL #3) 300-30 mg per tablet Take 1 Tab by mouth every six (6) hours as needed for Pain. Max Daily Amount: 4 Tabs. 15 Tab 0     Allergies   Allergen Reactions    Soap Hives       Objective:  Visit Vitals    /73 (BP 1 Location: Left arm, BP Patient Position: Sitting)    Pulse 74    Temp 99.3 °F (37.4 °C) (Oral)    Resp 18    Ht 5' 4\" (1.626 m)    Wt 115 lb 6.4 oz (52.3 kg)    LMP 02/17/2017 (Exact Date)    SpO2 97%    BMI 19.81 kg/m2     Wt Readings from Last 3 Encounters:   03/16/17 115 lb 6.4 oz (52.3 kg)   02/29/16 135 lb (61.2 kg)   01/03/13 112 lb (50.8 kg)     Physical Exam:   General appearance - alert, well appearing, and in mild distress. Guarded, hold RUQ with deep breathing. Mental status - A/O x 4, normal mood and affect. Neck -Supple ,normal CSP. FROM, non-tender. No significant adenopathy/thyromegaly. No JVD. Chest - CTA. Symmetric chest rise. No wheezing, rales or rhonchi. Heart - Normal rate, regular rhythm. Normal S1, S2. No MGR or clicks. Abdomen - Soft,non-distended. Normoactive BS in all quadrants. Epigastric and RUQ pain, no mass or HSM. Ext- Radial, DP pulses, 2+ bilaterally. No pedal edema, clubbing, or cyanosis. Skin-Warm and dry. No hyperpigmentation, ulcerations, or suspicious lesions. Neuro - Normal speech, no focal findings or movement disorder. Normal strength, gait, and muscle tone. Assessment/Plan:  CT abd with contrast ordered. Referred to GI.  Trial of omeprazole. Repeat UA at f/u for UTI clearance. Referred to GYN per request for \"cyst\". Tylenol #3 since tramadol with sedating effects. Medication Side Effects and Warnings were discussed with patient: yes   Patient Labs were reviewed: yes  Patient Past Records were reviewed: yes    See below for other orders   Follow-up Disposition:  Return in about 2 weeks (around 3/30/2017) for 2 wk f/u. ICD-10-CM ICD-9-CM    1. RUQ abdominal pain R10.11 789.01 AMB POC URINALYSIS DIP STICK AUTO W/O MICRO      CT ABD PELV W CONT      REFERRAL TO GASTROENTEROLOGY   2. Vaginal cyst N89.8 623.8 REFERRAL TO GYNECOLOGY   3. Gastroesophageal reflux disease without esophagitis K21.9 530.81 omeprazole (PRILOSEC) 20 mg capsule     Orders Placed This Encounter    CT ABD PELV W CONT     Standing Status:   Future     Standing Expiration Date:   4/16/2018     Order Specific Question:   Is Patient Allergic to Contrast Dye? Answer:   No     Order Specific Question:   Is Patient Pregnant? Answer:   No    REFERRAL TO GYNECOLOGY     Referral Priority:   Routine     Referral Type:   Consultation     Referral Reason:   Specialty Services Required     Referred to Provider:   Magdy Forrester MD    REFERRAL TO GASTROENTEROLOGY     Referral Priority:   Routine     Referral Type:   Consultation     Referral Reason:   Specialty Services Required     Referral Location:   [facility]     Referred to Provider:   Alison Vera MD    AMB POC URINALYSIS DIP STICK AUTO W/O MICRO    DISCONTD: traMADol (ULTRAM) 50 mg tablet     Sig: TAKE 1 TABLET BY MOUTH EVERY 8 HOURS AS NEEDED FOR PAIN     Refill:  0    ibuprofen (MOTRIN) 600 mg tablet     Sig: Take  by mouth every six (6) hours as needed for Pain.  cephALEXin (KEFLEX) 500 mg capsule     Sig: Take 500 mg by mouth four (4) times daily.  omeprazole (PRILOSEC) 20 mg capsule     Sig: Take 1 Cap by mouth daily.      Dispense:  14 Cap     Refill:  0    acetaminophen-codeine (TYLENOL #3) 300-30 mg per tablet     Sig: Take 1 Tab by mouth every six (6) hours as needed for Pain. Max Daily Amount: 4 Tabs. Dispense:  15 Tab     Refill:  0       Radu Messing expressed understanding of plan. An After Visit Summary was offered/printed and given to the patient.

## 2017-03-23 DIAGNOSIS — R10.11 RUQ ABDOMINAL PAIN: Primary | ICD-10-CM

## 2017-03-23 NOTE — PROGRESS NOTES
After speaking with nurse reviewer, insurance plan prefers an initial RUQ U/S when r/o acute cholecystitis. Will order, kept order open, as CT ABD can get approved by Monday if US non-diagnostic with continued concern for acute illness.

## 2017-03-27 ENCOUNTER — HOSPITAL ENCOUNTER (OUTPATIENT)
Dept: ULTRASOUND IMAGING | Age: 32
Discharge: HOME | End: 2017-03-27
Attending: NURSE PRACTITIONER
Payer: MEDICAID

## 2017-03-27 ENCOUNTER — HOSPITAL ENCOUNTER (OUTPATIENT)
Dept: CT IMAGING | Age: 32
Discharge: HOME | End: 2017-03-27
Attending: NURSE PRACTITIONER
Payer: MEDICAID

## 2017-03-27 DIAGNOSIS — R10.11 RUQ ABDOMINAL PAIN: ICD-10-CM

## 2017-03-27 PROCEDURE — 76705 ECHO EXAM OF ABDOMEN: CPT

## 2017-03-27 PROCEDURE — 74177 CT ABD & PELVIS W/CONTRAST: CPT

## 2017-03-27 PROCEDURE — 74011636320 HC RX REV CODE- 636/320: Performed by: RADIOLOGY

## 2017-03-27 RX ORDER — SODIUM CHLORIDE 0.9 % (FLUSH) 0.9 %
5-10 SYRINGE (ML) INJECTION
Status: COMPLETED | OUTPATIENT
Start: 2017-03-27 | End: 2017-03-27

## 2017-03-27 RX ADMIN — Medication 10 ML: at 11:00

## 2017-03-27 RX ADMIN — IOPAMIDOL 100 ML: 755 INJECTION, SOLUTION INTRAVENOUS at 11:00

## 2017-03-28 ENCOUNTER — OFFICE VISIT (OUTPATIENT)
Dept: INTERNAL MEDICINE CLINIC | Age: 32
End: 2017-03-28

## 2017-03-28 VITALS
HEART RATE: 78 BPM | TEMPERATURE: 98.4 F | RESPIRATION RATE: 18 BRPM | HEIGHT: 64 IN | DIASTOLIC BLOOD PRESSURE: 75 MMHG | BODY MASS INDEX: 19.94 KG/M2 | OXYGEN SATURATION: 96 % | WEIGHT: 116.8 LBS | SYSTOLIC BLOOD PRESSURE: 108 MMHG

## 2017-03-28 DIAGNOSIS — R10.11 RUQ ABDOMINAL PAIN: Primary | ICD-10-CM

## 2017-03-28 DIAGNOSIS — K21.9 GASTROESOPHAGEAL REFLUX DISEASE WITHOUT ESOPHAGITIS: ICD-10-CM

## 2017-03-28 RX ORDER — IBUPROFEN 400 MG/1
400 TABLET ORAL
Qty: 20 TAB | Refills: 0 | Status: SHIPPED | OUTPATIENT
Start: 2017-03-28 | End: 2017-04-07

## 2017-03-28 RX ORDER — OMEPRAZOLE 20 MG/1
20 CAPSULE, DELAYED RELEASE ORAL DAILY
Qty: 30 CAP | Refills: 1 | Status: SHIPPED | OUTPATIENT
Start: 2017-03-28 | End: 2017-11-23

## 2017-03-28 NOTE — PROGRESS NOTES
Virgie Nava is a 33 y.o. female and presents with Abdominal Pain (follow up)    Subjective:  Pt here to f/u RUQ pain. Improving some, but still occurring intermittently with deep breathing. Taking omeprazole PRN for indigestion with relief, but not daily as prescribed. Has YET to make appt for GI and GYN. Ibuprofen very helpful. Review of Systems  Constitutional: negative for fevers, chills, anorexia and weight loss  Respiratory:  negative for cough, hemoptysis, dyspnea, and wheezing  CV:   negative for chest pain, palpitations, and lower extremity edema  GI:   negative for nausea, vomiting, diarrhea, and melena  Endo:               negative for polyuria,polydipsia,polyphagia, and heat intolerance  Genitourinary: negative for frequency, urgency, dysuria, retention, and hematuria      Past Medical History:   Diagnosis Date    Kidney disorder      History reviewed. No pertinent surgical history. Social History     Social History    Marital status: SINGLE     Spouse name: N/A    Number of children: N/A    Years of education: N/A     Social History Main Topics    Smoking status: Current Every Day Smoker     Types: Cigarettes    Smokeless tobacco: None    Alcohol use No    Drug use: No    Sexual activity: Not Asked     Other Topics Concern    None     Social History Narrative     History reviewed. No pertinent family history. Current Outpatient Prescriptions   Medication Sig Dispense Refill    ibuprofen (MOTRIN) 400 mg tablet Take 1 Tab by mouth every six (6) hours as needed for Pain. 20 Tab 0    omeprazole (PRILOSEC) 20 mg capsule Take 1 Cap by mouth daily. 30 Cap 1    acetaminophen-codeine (TYLENOL #3) 300-30 mg per tablet Take 1 Tab by mouth every six (6) hours as needed for Pain. Max Daily Amount: 4 Tabs.  15 Tab 0     Allergies   Allergen Reactions    Soap Hives       Objective:  Visit Vitals    /75 (BP 1 Location: Right arm, BP Patient Position: Sitting)    Pulse 78    Temp 98.4 °F (36.9 °C) (Oral)    Resp 18    Ht 5' 4\" (1.626 m)    Wt 116 lb 12.8 oz (53 kg)    LMP 03/17/2017 (Exact Date)    SpO2 96%    BMI 20.05 kg/m2     Wt Readings from Last 3 Encounters:   03/28/17 116 lb 12.8 oz (53 kg)   03/16/17 115 lb 6.4 oz (52.3 kg)   02/29/16 135 lb (61.2 kg)     Physical Exam:   General appearance - alert, well appearing, and in no distress. Mental status - A/O x 4, normal mood and affect. Neck -Supple ,normal CSP. FROM, non-tender. No significant adenopathy/thyromegaly. No JVD. Chest - CTA. Symmetric chest rise. No wheezing, rales or rhonchi. Heart - Normal rate, regular rhythm. Normal S1, S2. No MGR or clicks. Abdomen - Soft,non-distended. Normoactive BS in all quadrants. NT, no mass or HSM. Right flank TTP and right ant lower rib TTP. Ext- Radial, DP pulses, 2+ bilaterally. No pedal edema, clubbing, or cyanosis. Skin-Warm and dry. No hyperpigmentation, ulcerations, or suspicious lesions. Neuro - Normal speech, no focal findings or movement disorder. Normal strength, gait, and muscle tone. Assessment/Plan:  Cont with referrals GI and GYN. Medication Side Effects and Warnings were discussed with patient: yes   Patient Labs were reviewed: yes  Patient Past Records were reviewed: yes    See below for other orders   Follow-up Disposition:  Return in about 4 weeks (around 4/25/2017) for 1 month f/u. ICD-10-CM ICD-9-CM    1. RUQ abdominal pain R10.11 789.01 ibuprofen (MOTRIN) 400 mg tablet   2. Gastroesophageal reflux disease without esophagitis K21.9 530.81 omeprazole (PRILOSEC) 20 mg capsule     Orders Placed This Encounter    ibuprofen (MOTRIN) 400 mg tablet     Sig: Take 1 Tab by mouth every six (6) hours as needed for Pain. Dispense:  20 Tab     Refill:  0    omeprazole (PRILOSEC) 20 mg capsule     Sig: Take 1 Cap by mouth daily. Dispense:  30 Cap     Refill:  1       Lindsay Coffman expressed understanding of plan.  An After Visit Summary was offered/printed and given to the patient.

## 2017-03-28 NOTE — PROGRESS NOTES
Pt is here for   Chief Complaint   Patient presents with    Abdominal Pain     follow up     Pt states pain level is a 3 ribs and abdomen

## 2017-03-28 NOTE — MR AVS SNAPSHOT
Visit Information Date & Time Provider Department Dept. Phone Encounter #  
 3/28/2017 11:00 AM Jonathan Smith NP [address] [facility] [phone] [id number] Follow-up Instructions Return in about 4 weeks (around 4/25/2017) for 1 month f/u. Upcoming Health Maintenance Date Due Pneumococcal 19-64 Medium Risk (1 of 1 - PPSV23) 2/3/2005 PAP AKA CERVICAL CYTOLOGY 2/3/2007 DTaP/Tdap/Td series (2 - Td) 3/16/2027 Allergies as of 3/28/2017  Review Complete On: 3/28/2017 By: Jonathan Smith NP Severity Noted Reaction Type Reactions Soap  01/03/2013    Hives Current Immunizations  Reviewed on 2/29/2016 No immunizations on file. Not reviewed this visit You Were Diagnosed With   
  
 Codes Comments RUQ abdominal pain    -  Primary ICD-10-CM: R10.11 ICD-9-CM: 789.01 Gastroesophageal reflux disease without esophagitis     ICD-10-CM: K21.9 ICD-9-CM: 530.81 Vitals BP Pulse Temp Resp Height(growth percentile) Weight(growth percentile) 108/75 (BP 1 Location: Right arm, BP Patient Position: Sitting) 78 98.4 °F (36.9 °C) (Oral) 18 5' 4\" (1.626 m) 116 lb 12.8 oz (53 kg) LMP SpO2 BMI OB Status Smoking Status 03/17/2017 (Exact Date) 96% 20.05 kg/m2 Having regular periods Current Every Day Smoker Vitals History BMI and BSA Data Body Mass Index Body Surface Area 20.05 kg/m 2 1.55 m 2 Preferred Pharmacy Pharmacy Name Phone MARQUITA TUBBS [facility] [address] , [address] [facility] [phone] Your Updated Medication List  
  
   
This list is accurate as of: 3/28/17 12:42 PM.  Always use your most recent med list.  
  
  
  
  
 acetaminophen-codeine 300-30 mg per tablet Commonly known as:  TYLENOL #3 Take 1 Tab by mouth every six (6) hours as needed for Pain. Max Daily Amount: 4 Tabs. ibuprofen 400 mg tablet Commonly known as:  MOTRIN  
 Take 1 Tab by mouth every six (6) hours as needed for Pain. omeprazole 20 mg capsule Commonly known as:  PRILOSEC Take 1 Cap by mouth daily. Prescriptions Sent to Pharmacy Refills  
 ibuprofen (MOTRIN) 400 mg tablet 0 Sig: Take 1 Tab by mouth every six (6) hours as needed for Pain. Class: Normal  
 Pharmacy: [address], [address] [place] [address] Ph #: [phone] Route: Oral  
 omeprazole (PRILOSEC) 20 mg capsule 1 Sig: Take 1 Cap by mouth daily. Class: Normal  
 Pharmacy: [address] Ph #: [phone] Route: Oral  
  
Follow-up Instructions Return in about 4 weeks (around 4/25/2017) for 1 month f/u. Introducing [facility]! Dereck Bae introduces ServiceRelated patient portal. Now you can access parts of your medical record, email your doctor's office, and request medication refills online. 1. In your internet browser, go to [email or web address] Chinese 2. Click on the First Time User? Click Here link in the Sign In box. You will see the New Member Sign Up page. 3. Enter your ServiceRelated Access Code exactly as it appears below. You will not need to use this code after youve completed the sign-up process. If you do not sign up before the expiration date, you must request a new code. · ServiceRelated Access Code: [facility] Expires: 6/14/2017  3:11 PM 
 
4. Enter the last four digits of your Social Security Number (xxxx) and Date of Birth (mm/dd/yyyy) as indicated and click Submit. You will be taken to the next sign-up page. 5. Create a Zingt ID. This will be your ServiceRelated login ID and cannot be changed, so think of one that is secure and easy to remember. 6. Create a ServiceRelated password. You can change your password at any time. 7. Enter your Password Reset Question and Answer. This can be used at a later time if you forget your password. 8. Enter your e-mail address. You will receive e-mail notification when new information is available in [address] 9. Click Sign Up. You can now view and download portions of your medical record. 10. Click the Download Summary menu link to download a portable copy of your medical information. If you have questions, please visit the Frequently Asked Questions section of the MediVision website. Remember, MediVision is NOT to be used for urgent needs. For medical emergencies, dial 911. Now available from your iPhone and Android! Please provide this summary of care documentation to your next provider. Your primary care clinician is listed as NIKOS Bazan. If you have any questions after today's visit, please call [phone].

## 2017-03-29 ENCOUNTER — CLINICAL SUPPORT (OUTPATIENT)
Dept: INTERNAL MEDICINE CLINIC | Age: 32
End: 2017-03-29

## 2017-03-29 DIAGNOSIS — Z23 ENCOUNTER FOR IMMUNIZATION: ICD-10-CM

## 2017-03-29 DIAGNOSIS — Z11.1 SCREENING EXAMINATION FOR PULMONARY TUBERCULOSIS: ICD-10-CM

## 2017-03-29 NOTE — PROGRESS NOTES
Pt is here for   Chief Complaint   Patient presents with    PPD Placement     PPD was placed in patients left forearm. ..  Pt is expected to return on Friday 3/31/17 for PPD reading

## 2017-03-31 ENCOUNTER — CLINICAL SUPPORT (OUTPATIENT)
Dept: INTERNAL MEDICINE CLINIC | Age: 32
End: 2017-03-31

## 2017-03-31 DIAGNOSIS — Z11.1 ENCOUNTER FOR PPD SKIN TEST READING: Primary | ICD-10-CM

## 2017-03-31 LAB
MM INDURATION POC: 0 MM (ref 0–5)
PPD POC: NEGATIVE NEGATIVE

## 2017-04-07 ENCOUNTER — HOSPITAL ENCOUNTER (EMERGENCY)
Age: 32
Discharge: HOME | End: 2017-04-07
Attending: EMERGENCY MEDICINE
Payer: MEDICAID

## 2017-04-07 VITALS
BODY MASS INDEX: 20.49 KG/M2 | WEIGHT: 120 LBS | RESPIRATION RATE: 16 BRPM | OXYGEN SATURATION: 99 % | SYSTOLIC BLOOD PRESSURE: 114 MMHG | DIASTOLIC BLOOD PRESSURE: 74 MMHG | HEART RATE: 74 BPM | TEMPERATURE: 98.6 F | HEIGHT: 64 IN
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
## 2017-04-07 DIAGNOSIS — T07.XXXA MULTIPLE ABRASIONS: Primary | ICD-10-CM

## 2017-04-07 PROCEDURE — 75810000275 HC EMERGENCY DEPT VISIT NO LEVEL OF CARE

## 2017-04-07 PROCEDURE — 99284 EMERGENCY DEPT VISIT MOD MDM: CPT

## 2017-04-07 PROCEDURE — 74011250637 HC RX REV CODE- 250/637: Performed by: NURSE PRACTITIONER

## 2017-04-07 RX ADMIN — NEOMYCIN-BACITRACIN-POLYMYXIN OINT 1 PACKET: OINTMENT at 14:08

## 2017-04-07 NOTE — DISCHARGE INSTRUCTIONS
Scrapes (Abrasions): Care Instructions  Your Care Instructions  Scrapes (abrasions) are wounds where your skin has been rubbed or torn off. Most scrapes do not go deep into the skin, but some may remove several layers of skin. Scrapes usually don't bleed much, but they may ooze pinkish fluid. Scrapes on the head or face may appear worse than they are. They may bleed a lot because of the good blood supply to this area. Most scrapes heal well and may not need a bandage. They usually heal within 3 to 7 days. A large, deep scrape may take 1 to 2 weeks or longer to heal. A scab may form on some scrapes. Follow-up care is a key part of your treatment and safety. Be sure to make and go to all appointments, and call your doctor if you are having problems. It's also a good idea to know your test results and keep a list of the medicines you take. How can you care for yourself at home? · If your doctor told you how to care for your wound, follow your doctor's instructions. If you did not get instructions, follow this general advice:  ¨ Wash the scrape with clean water 2 times a day. Don't use hydrogen peroxide or alcohol, which can slow healing. ¨ You may cover the scrape with a thin layer of petroleum jelly, such as Vaseline, and a nonstick bandage. ¨ Apply more petroleum jelly and replace the bandage as needed. · Prop up the injured area on a pillow anytime you sit or lie down during the next 3 days. Try to keep it above the level of your heart. This will help reduce swelling. · Be safe with medicines. Take pain medicines exactly as directed. ¨ If the doctor gave you a prescription medicine for pain, take it as prescribed. ¨ If you are not taking a prescription pain medicine, ask your doctor if you can take an over-the-counter medicine. When should you call for help?   Call your doctor now or seek immediate medical care if:  · You have signs of infection, such as:  ¨ Increased pain, swelling, warmth, or redness around the scrape. ¨ Red streaks leading from the scrape. ¨ Pus draining from the scrape. ¨ A fever. · The scrape starts to bleed, and blood soaks through the bandage. Oozing small amounts of blood is normal.  Watch closely for changes in your health, and be sure to contact your doctor if the scrape is not getting better each day. Where can you learn more? Go to http://rl-lavon.info/. Enter A374 in the search box to learn more about \"Scrapes (Abrasions): Care Instructions. \"  Current as of: May 27, 2016  Content Version: 11.2  © 6046-6232 [employer]. Care instructions adapted under license by [employer] (which disclaims liability or warranty for this information). If you have questions about a medical condition or this instruction, always ask your healthcare professional. [place] [address] any warranty or liability for your use of this information. Bruises: Care Instructions  Your Care Instructions    Bruises occur when small blood vessels under the skin tear or rupture, most often from a twist, bump, or fall. Blood leaks into tissues under the skin and causes a black-and-blue spot that often turns colors, including purplish black, reddish blue, or yellowish green, as the bruise heals. Bruises hurt, but most are not serious and will go away on their own within 2 to 4 weeks. Sometimes, gravity causes them to spread down the body. A leg bruise usually will take longer to heal than a bruise on the face or arms. Follow-up care is a key part of your treatment and safety. Be sure to make and go to all appointments, and call your doctor if you are having problems. Its also a good idea to know your test results and keep a list of the medicines you take. How can you care for yourself at home? · Take pain medicines exactly as directed. ¨ If the doctor gave you a prescription medicine for pain, take it as prescribed.   ¨ If you are not taking a prescription pain medicine, ask your doctor if you can take an over-the-counter medicine. · Put ice or a cold pack on the area for 10 to 20 minutes at a time. Put a thin cloth between the ice and your skin. · If you can, prop up the bruised area on pillows as much as possible for the next few days. Try to keep the bruise above the level of your heart. When should you call for help? Call your doctor now or seek immediate medical care if:  · You have signs of infection, such as:  ¨ Increased pain, swelling, warmth, or redness. ¨ Red streaks leading from the bruise. ¨ Pus draining from the bruise. ¨ A fever. · You have a bruise on your leg and signs of a blood clot, such as:  ¨ Increasing redness and swelling along with warmth, tenderness, and pain in the bruised area. ¨ Pain in your calf, back of the knee, thigh, or groin. ¨ Redness and swelling in your leg or groin. · Your pain gets worse. Watch closely for changes in your health, and be sure to contact your doctor if:  · You do not get better as expected. Where can you learn more? Go to http://rl-lavon.info/. Enter [phone] [phone] in the search box to learn more about \"Bruises: Care Instructions. \"  Current as of: May 27, 2016  Content Version: 11.2  © 3389-4239 [employer]. Care instructions adapted under license by [employer] (which disclaims liability or warranty for this information). If you have questions about a medical condition or this instruction, always ask your healthcare professional. [place] [address] any warranty or liability for your use of this information.
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
## 2017-04-11 ENCOUNTER — PATIENT OUTREACH (OUTPATIENT)
Dept: INTERNAL MEDICINE CLINIC | Age: 32
End: 2017-04-11
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
## 2017-06-02 ENCOUNTER — PATIENT OUTREACH (OUTPATIENT)
Dept: INTERNAL MEDICINE CLINIC | Age: 32
End: 2017-06-02

## 2017-06-02 NOTE — PROGRESS NOTES
Attempted to reach patient via telephone, no answe. Patient has never responded to any outreach. Will close this episode.

## 2017-11-23 ENCOUNTER — HOSPITAL ENCOUNTER (EMERGENCY)
Age: 32
Discharge: HOME | End: 2017-11-23
Attending: EMERGENCY MEDICINE
Payer: COMMERCIAL

## 2017-11-23 ENCOUNTER — APPOINTMENT (OUTPATIENT)
Dept: GENERAL RADIOLOGY | Age: 32
End: 2017-11-23
Attending: EMERGENCY MEDICINE
Payer: COMMERCIAL

## 2017-11-23 VITALS
HEART RATE: 93 BPM | TEMPERATURE: 99.2 F | BODY MASS INDEX: 19.63 KG/M2 | WEIGHT: 115 LBS | RESPIRATION RATE: 17 BRPM | DIASTOLIC BLOOD PRESSURE: 70 MMHG | SYSTOLIC BLOOD PRESSURE: 111 MMHG | OXYGEN SATURATION: 100 % | HEIGHT: 64 IN

## 2017-11-23 DIAGNOSIS — S92.531A CLOSED DISPLACED FRACTURE OF DISTAL PHALANX OF LESSER TOE OF RIGHT FOOT, INITIAL ENCOUNTER: Primary | ICD-10-CM

## 2017-11-23 PROCEDURE — 77030013175 HC SHOE PSTOP DJOR -A

## 2017-11-23 PROCEDURE — 99283 EMERGENCY DEPT VISIT LOW MDM: CPT

## 2017-11-23 PROCEDURE — 74011250637 HC RX REV CODE- 250/637: Performed by: EMERGENCY MEDICINE

## 2017-11-23 PROCEDURE — 73630 X-RAY EXAM OF FOOT: CPT

## 2017-11-23 RX ORDER — TRAMADOL HYDROCHLORIDE 50 MG/1
50 TABLET ORAL
Qty: 20 TAB | Refills: 0 | Status: SHIPPED | OUTPATIENT
Start: 2017-11-23 | End: 2017-12-03

## 2017-11-23 RX ORDER — IBUPROFEN 600 MG/1
600 TABLET ORAL
Qty: 30 TAB | Refills: 0 | Status: SHIPPED | OUTPATIENT
Start: 2017-11-23 | End: 2017-12-05

## 2017-11-23 RX ORDER — IBUPROFEN 600 MG/1
600 TABLET ORAL
Status: COMPLETED | OUTPATIENT
Start: 2017-11-23 | End: 2017-11-23

## 2017-11-23 RX ADMIN — IBUPROFEN 600 MG: 600 TABLET, FILM COATED ORAL at 17:40

## 2017-11-23 NOTE — ED NOTES
Patient (s)  given copy of dc instructions and 1 paper script(s) and 1 electronic scripts. Patient (s)  verbalized understanding of instructions and script (s). Patient given a current medication reconciliation form and verbalized understanding of their medications. Patient (s) verbalized understanding of the importance of discussing medications with  his or her physician or clinic they will be following up with. Patient alert and oriented and in no acute distress. Patient offered wheelchair from treatment area to hospital entrance, patient declines wheelchair.

## 2017-11-23 NOTE — LETTER
[facility] EMERGENCY DEPT 
[address] [facility] [place] [address] [id number] 
[phone] Work/School Note Date: 11/23/2017 To Whom It May concern: 
 
Dhruv Melgar was seen and treated today in the emergency room by the following provider(s): 
Attending Provider: Jos Adamson MD. Dhruv Melgar may return to work on 11/24/2017.  
 
Sincerely, 
 
 
 
 
Jos Adamson MD

## 2017-11-23 NOTE — DISCHARGE INSTRUCTIONS
Broken Toe: Care Instructions  Your Care Instructions  You have broken (fractured) a bone in your toe. This kind of fracture does not need a special cast or brace. \"Paige-taping\" your broken toe to a healthy toe next to it is almost always enough to treat the problem and ease symptoms. The toe may take 4 weeks or more to heal.  You heal best when you take good care of yourself. Eat a variety of healthy foods, and don't smoke. Follow-up care is a key part of your treatment and safety. Be sure to make and go to all appointments, and call your doctor if you are having problems. It's also a good idea to know your test results and keep a list of the medicines you take. How can you care for yourself at home? · Be safe with medicines. Take pain medicines exactly as directed. ¨ If the doctor gave you a prescription medicine for pain, take it as prescribed. ¨ If you are not taking a prescription pain medicine, ask your doctor if you can take an over-the-counter medicine. · If your toe is taped to the toe next to it, your doctor has shown you how to change the tape. Protect the skin by putting something soft, such as felt or foam, between your toes before you tape them together. Never tape the toes together skin-to-skin. Your broken toe may need to be paige-taped for 2 to 4 weeks to heal.  · Rest and protect your toe. Do not walk on it until you can do so without too much pain. If the doctor has told you to use crutches, use them as instructed. · Put ice or a cold pack on your toe for 10 to 20 minutes at a time. Try to do this every 1 to 2 hours for the next 3 days (when you are awake) or until the swelling goes down. Put a thin cloth between the ice and your skin. · Prop up your foot on a pillow when you ice it or anytime you sit or lie down. Try to keep it above the level of your heart. This will help reduce swelling. · Make sure you go to your follow-up appointments.  Your doctor will need to check that your toe is healing right. When should you call for help? Call your doctor now or seek immediate medical care if:  ? · You have severe pain. ? · Your toe is cool or pale or changes color. ? · You have tingling, weakness, or numbness in your toe. ? Watch closely for changes in your health, and be sure to contact your doctor if:  ? · Pain and swelling get worse. ? · You are not getting better as expected. Where can you learn more? Go to http://rl-lavon.info/. Enter P267 in the search box to learn more about \"Broken Toe: Care Instructions. \"  Current as of: March 21, 2017  Content Version: 11.4  © 4161-8571 [employer]. Care instructions adapted under license by [employer] (which disclaims liability or warranty for this information). If you have questions about a medical condition or this instruction, always ask your healthcare professional. [address] any warranty or liability for your use of this information.

## 2017-11-23 NOTE — ED PROVIDER NOTES
[facility]  EMERGENCY DEPARTMENT HISTORY AND PHYSICAL EXAM       Date of Service: 11/23/2017   Patient Name: Sangeetha Hahn   YOB: 1985  Medical Record Number: [id number]    History of Presenting Illness     Chief Complaint   Patient presents with    Toe Pain     last night pt twisted her ankle and hit her rt 5th toe. History Provided By:  patient    Additional History:   Sangeetha Hahn is a 33 y.o. female with PMhx significant for kidney disorder who presents to the ED accompanied by friend with cc of sudden onset of persistent R fourth and fifth toe pain radiating into her R foot that began at approximately 2300. Pt reports that she got up quickly while sitting on the couch and struck the table in front of her on the space between her third and fourth toes. She denies taking any analgesics for her pain but reports applying ice without relief. Pt notes that she has had difficulty ambulating secondary to her pain. She specifically denies nausea, vomiting, CP, SOB, or ankle pain. Social Hx: + Tobacco (0.25 ppd), - EtOH, - Illicit Drugs    There are no other complaints, changes or physical findings at this time. Primary Care Provider: Billie Cortes NP     Past History     Past Medical History:   Past Medical History:   Diagnosis Date    Kidney disorder         Past Surgical History:   History reviewed. No pertinent surgical history. Family History:   History reviewed. No pertinent family history. Social History:   Social History   Substance Use Topics    Smoking status: Current Every Day Smoker     Packs/day: 0.25     Types: Cigarettes    Smokeless tobacco: Never Used      Comment: 2-3 black and milds daily    Alcohol use No        Allergies: Allergies   Allergen Reactions    Soap Hives        Review of Systems   Review of Systems   Constitutional: Negative for chills and fever. HENT: Positive for sore throat.  Negative for congestion and rhinorrhea. Eyes: Negative for discharge and redness. Respiratory: Negative for cough and shortness of breath. Cardiovascular: Negative for chest pain. Gastrointestinal: Negative for abdominal distention, abdominal pain, constipation, diarrhea, nausea and vomiting. Genitourinary: Negative for decreased urine volume and urgency. Musculoskeletal: Negative for arthralgias and back pain. Positive for R fourth and fifth toe pain radiating into the foot. Negative for R ankle pain. Skin: Negative for rash. Neurological: Negative for dizziness, weakness, numbness and headaches. Psychiatric/Behavioral: Negative for confusion and decreased concentration. All other systems reviewed and are negative. Physical Exam  Physical Exam   Constitutional: She is oriented to person, place, and time. She appears well-developed and well-nourished. HENT:   Head: Normocephalic and atraumatic. Mouth/Throat: Oropharynx is clear and moist.   Eyes: Conjunctivae and EOM are normal.   Neck: Normal range of motion and full passive range of motion without pain. Neck supple. Cardiovascular: Normal rate, regular rhythm, S1 normal, S2 normal, normal heart sounds, intact distal pulses and normal pulses. No murmur heard. Pulmonary/Chest: Effort normal and breath sounds normal. No respiratory distress. She has no wheezes. Abdominal: Soft. Normal appearance and bowel sounds are normal. She exhibits no distension. There is no tenderness. There is no rebound. Musculoskeletal: Normal range of motion. Contusion and bruising over the R fourth and fifth metatarsal.    Neurological: She is alert and oriented to person, place, and time. She has normal strength. Skin: Skin is warm, dry and intact. No rash noted. Psychiatric: She has a normal mood and affect. Her speech is normal and behavior is normal. Judgment and thought content normal.   Nursing note and vitals reviewed.       Medical Decision Making   I am the first provider for this patient. I reviewed the vital signs, available nursing notes, past medical history, past surgical history, family history and social history. Provider Notes:   DDx sprain, strain, contusion, fracture     ED Course:  5:19 PM  Initial assessment performed. The patients presenting problems have been discussed, and they are in agreement with the care plan formulated and outlined with them. I have encouraged them to ask questions as they arise throughout their visit. Progress Note:  6:01 PM  Pt was updated on imaging results. She agrees with the current plan of care. Will place in Ortho shoe and have pt follow up with PCP. Written by Jai Sepulveda ED Scribe, as dictated by Farhad Tavares MD.    Procedure Note - Splint Placement:  6:18 PM  Performed by: Savi Aiken MD  Neurovascularly intact prior to tx. An Orthopedic post-op shoe was placed on pt's right foot. Joint was placed in neutral position. Neurovascularly intact after tx. The procedure took 10 minutes, and pt tolerated well. Diagnostic Study Results   Labs -    No results found for this or any previous visit (from the past 12 hour(s)). Radiologic Studies -  The following have been ordered and reviewed:  XR FOOT RT MIN 3 V   Final Result        Study Result      EXAM:  XR FOOT RT MIN 3 V     INDICATION:   injury to 4th and 5th digits yesterday evening, more swollen and  painful to ambulate, fracture? .     COMPARISON:  None.     FINDINGS:  Three views of the right foot demonstrate a nondisplaced fracture of  the metaphysis at the base of the fifth proximal phalanx with minimal lateral  angulation. There is no intra-articular extension. .  There is localized soft  tissue swelling. No other fractures are demonstrated. .     IMPRESSION  IMPRESSION:  Fracture of base of fifth proximal phalanx. .       Vital Signs-Reviewed the patient's vital signs.    Patient Vitals for the past 12 hrs:   Temp Pulse Resp BP SpO2   11/23/17 1734 99.2 °F (37.3 °C) 93 17 111/70 100 %       Medications Given in the ED:  Medications   ibuprofen (MOTRIN) tablet 600 mg (600 mg Oral Given 11/23/17 1740)       Diagnosis:  Clinical Impression:   1. Closed displaced fracture of distal phalanx of lesser toe of right foot, initial encounter         Plan:  1:   Follow-up Information     Follow up With Details Comments 500 No Calvillo NP Schedule an appointment as soon as possible for a visit  [address] [id number] [phone]      [facility] EMERGENCY DEPT  As needed, If symptoms worsen [address] [facility]  [phone]          2:   Current Discharge Medication List      START taking these medications    Details   traMADol (ULTRAM) 50 mg tablet Take 1 Tab by mouth every six (6) hours as needed for Pain for up to 10 days. Max Daily Amount: 200 mg. Qty: 20 Tab, Refills: 0      ibuprofen (MOTRIN) 600 mg tablet Take 1 Tab by mouth every eight (8) hours as needed for Pain. Qty: 30 Tab, Refills: 0           Return to ED if worse. Disposition:    DISCHARGE NOTE  6:07 PM  The patient has been re-evaluated and is ready for discharge. Reviewed available results with patient. Counseled pt on diagnosis and care plan. Pt has expressed understanding, and all questions have been answered. Pt agrees with plan and agrees to F/U as recommended, or return to the ED if their sxs worsen. Discharge instructions have been provided and explained to the pt, along with reasons to return to the ED. This note is prepared by Elvira Wooten, acting as Scribe for Ajith Giordano MD.    Ajith Giordano MD: The scribe's documentation has been prepared under my direction and personally reviewed by me in its entirety. I confirm that the note above accurately reflects all work, treatment, procedures, and medical decision making performed by me.

## 2017-11-23 NOTE — ED NOTES
Patient here with c/o injury to right foot. Patient reports pain since yesterday. Patient report pain in 5th toe. Patient states that she walking and hit her foot on an ottoman. Denies bleeding. Denies dizziness or lightheadedness. Denies hitting head, denies LOC. Patient reports small amount of redness present. Emergency Department Nursing Plan of Care       The Nursing Plan of Care is developed from the Nursing assessment and Emergency Department Attending provider initial evaluation. The plan of care may be reviewed in the ED Provider note.     The Plan of Care was developed with the following considerations:   Patient / Family readiness to learn indicated by:verbalized understanding  Persons(s) to be included in education: patient  Barriers to Learning/Limitations:No    Signed     Jeanne Raines RN    11/23/2017   5:52 PM

## 2017-12-05 ENCOUNTER — HOSPITAL ENCOUNTER (EMERGENCY)
Age: 32
Discharge: HOME | End: 2017-12-05
Attending: EMERGENCY MEDICINE | Admitting: EMERGENCY MEDICINE
Payer: COMMERCIAL

## 2017-12-05 ENCOUNTER — APPOINTMENT (OUTPATIENT)
Dept: ULTRASOUND IMAGING | Age: 32
End: 2017-12-05
Attending: PHYSICIAN ASSISTANT
Payer: COMMERCIAL

## 2017-12-05 VITALS
HEIGHT: 65 IN | OXYGEN SATURATION: 100 % | SYSTOLIC BLOOD PRESSURE: 105 MMHG | WEIGHT: 115 LBS | DIASTOLIC BLOOD PRESSURE: 65 MMHG | BODY MASS INDEX: 19.16 KG/M2 | HEART RATE: 84 BPM | TEMPERATURE: 98 F | RESPIRATION RATE: 18 BRPM

## 2017-12-05 DIAGNOSIS — Z32.01 PREGNANCY TEST PERFORMED, PREGNANCY CONFIRMED: Primary | ICD-10-CM

## 2017-12-05 LAB
ABO + RH BLD: NORMAL
ALBUMIN SERPL-MCNC: 3.9 G/DL (ref 3.5–5)
ALBUMIN/GLOB SERPL: 1.1 {RATIO} (ref 1.1–2.2)
ALP SERPL-CCNC: 62 U/L (ref 45–117)
ALT SERPL-CCNC: 29 U/L (ref 12–78)
ANION GAP SERPL CALC-SCNC: 12 MMOL/L (ref 5–15)
APPEARANCE UR: ABNORMAL
AST SERPL-CCNC: 24 U/L (ref 15–37)
BACTERIA URNS QL MICRO: NEGATIVE /HPF
BASOPHILS # BLD: 0 K/UL (ref 0–0.1)
BASOPHILS NFR BLD: 1 % (ref 0–1)
BILIRUB SERPL-MCNC: 0.4 MG/DL (ref 0.2–1)
BILIRUB UR QL: NEGATIVE
BLOOD GROUP ANTIBODIES SERPL: NORMAL
BUN SERPL-MCNC: 12 MG/DL (ref 6–20)
BUN/CREAT SERPL: 19 (ref 12–20)
CALCIUM SERPL-MCNC: 9.4 MG/DL (ref 8.5–10.1)
CHLORIDE SERPL-SCNC: 104 MMOL/L (ref 97–108)
CLUE CELLS VAG QL WET PREP: NORMAL
CO2 SERPL-SCNC: 21 MMOL/L (ref 21–32)
COLOR UR: ABNORMAL
CREAT SERPL-MCNC: 0.64 MG/DL (ref 0.55–1.02)
EOSINOPHIL # BLD: 0.1 K/UL (ref 0–0.4)
EOSINOPHIL NFR BLD: 2 % (ref 0–7)
EPITH CASTS URNS QL MICRO: ABNORMAL /LPF
ERYTHROCYTE [DISTWIDTH] IN BLOOD BY AUTOMATED COUNT: 14.3 % (ref 11.5–14.5)
GLOBULIN SER CALC-MCNC: 3.6 G/DL (ref 2–4)
GLUCOSE SERPL-MCNC: 82 MG/DL (ref 65–100)
GLUCOSE UR STRIP.AUTO-MCNC: NEGATIVE MG/DL
HCG SERPL-ACNC: ABNORMAL MIU/ML (ref 0–6)
HCG UR QL: POSITIVE
HCT VFR BLD AUTO: 37.3 % (ref 35–47)
HGB BLD-MCNC: 12.5 G/DL (ref 11.5–16)
HGB UR QL STRIP: ABNORMAL
KETONES UR QL STRIP.AUTO: NEGATIVE MG/DL
KOH PREP SPEC: NORMAL
LEUKOCYTE ESTERASE UR QL STRIP.AUTO: ABNORMAL
LYMPHOCYTES # BLD: 2.8 K/UL (ref 0.8–3.5)
LYMPHOCYTES NFR BLD: 40 % (ref 12–49)
MCH RBC QN AUTO: 25.7 PG (ref 26–34)
MCHC RBC AUTO-ENTMCNC: 33.5 G/DL (ref 30–36.5)
MCV RBC AUTO: 76.7 FL (ref 80–99)
MONOCYTES # BLD: 1.2 K/UL (ref 0–1)
MONOCYTES NFR BLD: 17 % (ref 5–13)
NEUTS SEG # BLD: 3 K/UL (ref 1.8–8)
NEUTS SEG NFR BLD: 40 % (ref 32–75)
NITRITE UR QL STRIP.AUTO: NEGATIVE
PH UR STRIP: 7.5 [PH] (ref 5–8)
PLATELET # BLD AUTO: 286 K/UL (ref 150–400)
POTASSIUM SERPL-SCNC: 4.3 MMOL/L (ref 3.5–5.1)
PROT SERPL-MCNC: 7.5 G/DL (ref 6.4–8.2)
PROT UR STRIP-MCNC: 100 MG/DL
RBC # BLD AUTO: 4.86 M/UL (ref 3.8–5.2)
RBC #/AREA URNS HPF: ABNORMAL /HPF (ref 0–5)
SERVICE CMNT-IMP: NORMAL
SODIUM SERPL-SCNC: 137 MMOL/L (ref 136–145)
SP GR UR REFRACTOMETRY: 1.02 (ref 1–1.03)
SPECIMEN EXP DATE BLD: NORMAL
T VAGINALIS VAG QL WET PREP: NORMAL
UA: UC IF INDICATED,UAUC: ABNORMAL
UROBILINOGEN UR QL STRIP.AUTO: 0.2 EU/DL (ref 0.2–1)
WBC # BLD AUTO: 7.1 K/UL (ref 3.6–11)
WBC URNS QL MICRO: ABNORMAL /HPF (ref 0–4)

## 2017-12-05 PROCEDURE — 84702 CHORIONIC GONADOTROPIN TEST: CPT | Performed by: PHYSICIAN ASSISTANT

## 2017-12-05 PROCEDURE — 85025 COMPLETE CBC W/AUTO DIFF WBC: CPT | Performed by: PHYSICIAN ASSISTANT

## 2017-12-05 PROCEDURE — 99284 EMERGENCY DEPT VISIT MOD MDM: CPT

## 2017-12-05 PROCEDURE — 87210 SMEAR WET MOUNT SALINE/INK: CPT | Performed by: PHYSICIAN ASSISTANT

## 2017-12-05 PROCEDURE — 36415 COLL VENOUS BLD VENIPUNCTURE: CPT | Performed by: PHYSICIAN ASSISTANT

## 2017-12-05 PROCEDURE — 87491 CHLMYD TRACH DNA AMP PROBE: CPT | Performed by: PHYSICIAN ASSISTANT

## 2017-12-05 PROCEDURE — 87086 URINE CULTURE/COLONY COUNT: CPT | Performed by: EMERGENCY MEDICINE

## 2017-12-05 PROCEDURE — 81025 URINE PREGNANCY TEST: CPT

## 2017-12-05 PROCEDURE — 81001 URINALYSIS AUTO W/SCOPE: CPT | Performed by: EMERGENCY MEDICINE

## 2017-12-05 PROCEDURE — 86900 BLOOD TYPING SEROLOGIC ABO: CPT | Performed by: PHYSICIAN ASSISTANT

## 2017-12-05 PROCEDURE — 80053 COMPREHEN METABOLIC PANEL: CPT | Performed by: PHYSICIAN ASSISTANT

## 2017-12-05 PROCEDURE — 76801 OB US < 14 WKS SINGLE FETUS: CPT

## 2017-12-05 PROCEDURE — 76817 TRANSVAGINAL US OBSTETRIC: CPT

## 2017-12-05 NOTE — DISCHARGE INSTRUCTIONS
Learning About Pregnancy  Your Care Instructions    Your health in the early weeks of your pregnancy is particularly important for your baby's health. Take good care of yourself. Anything you do that harms your body can also harm your baby. Make sure to go to all of your doctor appointments. Regular checkups will help keep you and your baby healthy. How can you care for yourself at home? Diet  ? · Eat a balanced diet. Make sure your diet includes plenty of beans, peas, and leafy green vegetables. ? · Do not skip meals or go for many hours without eating. If you are nauseated, try to eat a small, healthy snack every 2 to 3 hours. ? · Do not eat fish that has a high level of mercury, such as shark, swordfish, or mackerel. Do not eat more than one can of tuna each week. ? · Drink plenty of fluids, enough so that your urine is light yellow or clear like water. If you have kidney, heart, or liver disease and have to limit fluids, talk with your doctor before you increase the amount of fluids you drink. ? · Cut down on caffeine, such as coffee, tea, and cola. ? · Do not drink alcohol, such as beer, wine, or hard liquor. ? · Take a multivitamin that contains at least 400 micrograms (mcg) of folic acid to help prevent birth defects. Fortified cereal and whole wheat bread are good additional sources of folic acid. ? · Increase the calcium in your diet. Try to drink a quart of skim milk each day. You may also take calcium supplements and choose foods such as cheese and yogurt. ? Lifestyle  ? · Make sure you go to your follow-up appointments. ? · Get plenty of rest. You may be unusually tired while you are pregnant. ? · Get at least 30 minutes of exercise on most days of the week. Walking is a good choice. If you have not exercised in the past, start out slowly. Take several short walks each day. ? · Do not smoke. If you need help quitting, talk to your doctor about stop-smoking programs.  These can increase your chances of quitting for good. ? · Do not touch cat feces or litter boxes. Also, wash your hands after you handle raw meat, and fully cook all meat before you eat it. Wear gloves when you work in the yard or garden, and wash your hands well when you are done. Cat feces, raw or undercooked meat, and contaminated dirt can cause an infection that may harm your baby or lead to a miscarriage. ? · Do not use saunas or hot tubs. Raising your body temperature may harm your baby. ? · Avoid chemical fumes, paint fumes, or poisons. ? · Do not use illegal drugs or alcohol. Medicines  ? · Review all of your medicines with your doctor. Some of your routine medicines may need to be changed to protect your baby. ? · Use acetaminophen (Tylenol) to relieve minor problems, such as a mild headache or backache or a mild fever with cold symptoms. Do not use nonsteroidal anti-inflammatory drugs (NSAIDs), such as ibuprofen (Advil, Motrin) or naproxen (Aleve), unless your doctor says it is okay. ? · Do not take two or more pain medicines at the same time unless the doctor told you to. Many pain medicines have acetaminophen, which is Tylenol. Too much acetaminophen (Tylenol) can be harmful. ? · Take your medicines exactly as prescribed. Call your doctor if you think you are having a problem with your medicine. ?To manage morning sickness  ? · If you feel sick when you first wake up, try eating a small snack (such as crackers) before you get out of bed. Allow some time to digest the snack, and then get out of bed slowly. ? · Do not skip meals or go for long periods without eating. An empty stomach can make nausea worse. ? · Eat small, frequent meals instead of three large meals each day. ? · Drink plenty of fluids. Sports drinks, such as Gatorade or Powerade, are good choices. ? · Eat foods that are high in protein but low in fat.    ? · If you are taking iron supplements, ask your doctor if they are necessary. Iron can make nausea worse. ? · Avoid any smells, such as coffee, that make you feel sick. ? · Get lots of rest. Morning sickness may be worse when you are tired. Follow-up care is a key part of your treatment and safety. Be sure to make and go to all appointments, and call your doctor if you are having problems. It's also a good idea to know your test results and keep a list of the medicines you take. Where can you learn more? Go to http://rl-lavon.info/. Enter R586 in the search box to learn more about \"Learning About Pregnancy. \"  Current as of: March 16, 2017  Content Version: 11.4  © 4891-0612 [employer]. Care instructions adapted under license by [email or web address] (which disclaims liability or warranty for this information). If you have questions about a medical condition or this instruction, always ask your healthcare professional. [address] any warranty or liability for your use of this information.

## 2017-12-05 NOTE — ED NOTES
Pt reports intermittent pelvic cramping that has been \"for weeks\", reports taking a positive pregnancy test last week, reports breast tenderness; denies abnormal discharge, denies bleeding; reports vomiting, hx of ovarian cysts        Emergency Department Nursing Plan of Care       The Nursing Plan of Care is developed from the Nursing assessment and Emergency Department Attending provider initial evaluation. The plan of care may be reviewed in the ED Provider note.     The Plan of Care was developed with the following considerations:   Patient / Family readiness to learn indicated by:verbalized understanding  Persons(s) to be included in education: patient  Barriers to Learning/Limitations:No    Signed     Roseann Cooper RN    12/5/2017   1:35 PM

## 2017-12-05 NOTE — ED TRIAGE NOTES
patient presents with concerns of abdominal pain and cramping associated with early pregnancy. Has positive home pregnancy tests. Denies vaginal bleeding or discharge.

## 2017-12-05 NOTE — ED PROVIDER NOTES
HPI   To ED with complaints of intermittent pelvic pain past week. Positive home pregnancy test.  LMP early October. No bleeding since then. Has had intermittent right mid and left low abd pains, sharp. Not able to relate to movement or particular activity. Can last several minutes, occurring several times a day, more frequently past few days. Nausea, no vomiting. Normal bm. [place] today that her OB does not take her insurance. Past Medical History:   Diagnosis Date    Kidney disorder        Past Surgical History:   Procedure Laterality Date    HX GYN      D and C         History reviewed. No pertinent family history. Social History     Social History    Marital status: SINGLE     Spouse name: N/A    Number of children: N/A    Years of education: N/A     Occupational History    Not on file. Social History Main Topics    Smoking status: Current Some Day Smoker     Packs/day: 0.25     Types: Cigarettes    Smokeless tobacco: Never Used      Comment: 2-3 black and milds daily    Alcohol use No    Drug use: No    Sexual activity: Not on file     Other Topics Concern    Not on file     Social History Narrative         ALLERGIES: Soap    Review of Systems   Constitutional: Negative for chills and fever. HENT: Negative for congestion, ear pain and sore throat. Eyes: Negative for pain and discharge. Respiratory: Negative for cough and shortness of breath. Cardiovascular: Negative for chest pain. Gastrointestinal: Positive for abdominal pain. Negative for nausea and vomiting. Genitourinary: Negative for difficulty urinating, dysuria, frequency, hematuria, urgency and vaginal discharge. Musculoskeletal: Negative for back pain and neck pain. Skin: Negative for rash and wound. Neurological: Negative for seizures, syncope and headaches. All other systems reviewed and are negative.       Vitals:    12/05/17 1326   BP: 127/67   Pulse: 79   Resp: 18   Temp: 98 °F (36.7 °C)   SpO2: 99%   Weight: 52.2 kg (115 lb)   Height: 5' 4.8\" (1.646 m)            Physical Exam   Constitutional: She is oriented to person, place, and time. She appears well-developed and well-nourished. HENT:   Head: Normocephalic and atraumatic. Right Ear: External ear normal.   Left Ear: External ear normal.   Nose: Nose normal.   Mouth/Throat: Oropharynx is clear and moist.   Eyes: Conjunctivae and EOM are normal. Pupils are equal, round, and reactive to light. Neck: Normal range of motion. Neck supple. Cardiovascular: Normal rate, regular rhythm and normal heart sounds. Exam reveals no gallop and no friction rub. No murmur heard. Pulmonary/Chest: Effort normal and breath sounds normal. She has no wheezes. She has no rales. Abdominal: Soft. Bowel sounds are normal. There is no tenderness. There is no rebound and no guarding. Musculoskeletal: Normal range of motion. Neurological: She is alert and oriented to person, place, and time. She has normal reflexes. Skin: Skin is warm and dry. Psychiatric: She has a normal mood and affect. Her behavior is normal.   Nursing note and vitals reviewed. MDM  Number of Diagnoses or Management Options  Pregnancy test performed, pregnancy confirmed:   Diagnosis management comments: DDX: pregnancy, ectopic pregnancy, uti, sti, vaginitis    Pt request STD screen but declines treatment given pregnancy. She confirms number on record is correct should she need to contacted for STD treatment. ED Course       Pelvic Exam  Date/Time: 12/5/2017 3:17 PM  Performed by: PA  Type of exam performed: bimanual and speculum. External genitalia appearance: normal.    Vaginal exam:  discharge. The discharge was cottage cheese like. Cervical exam:  normal.    Specimen(s) collected:  chlamydia and GC (wet prep, andriy). Bimanual exam:  normal.    Comments: Uterus and adnexa non tender. Cervix closed.            LABORATORY TESTS:  Recent Results (from the past 12 hour(s))   URINALYSIS W/ REFLEX CULTURE    Collection Time: 12/05/17  1:38 PM   Result Value Ref Range    Color YELLOW/STRAW      Appearance CLOUDY (A) CLEAR      Specific gravity 1.025 1.003 - 1.030      pH (UA) 7.5 5.0 - 8.0      Protein 100 (A) NEG mg/dL    Glucose NEGATIVE  NEG mg/dL    Ketone NEGATIVE  NEG mg/dL    Bilirubin NEGATIVE  NEG      Blood LARGE (A) NEG      Urobilinogen 0.2 0.2 - 1.0 EU/dL    Nitrites NEGATIVE  NEG      Leukocyte Esterase LARGE (A) NEG      WBC 10-20 0 - 4 /hpf    RBC 5-10 0 - 5 /hpf    Epithelial cells FEW FEW /lpf    Bacteria NEGATIVE  NEG /hpf    UA:UC IF INDICATED URINE CULTURE ORDERED (A) CNI     HCG URINE, QL. - POC    Collection Time: 12/05/17  1:40 PM   Result Value Ref Range    Pregnancy test,urine (POC) POSITIVE (A) NEG     CBC WITH AUTOMATED DIFF    Collection Time: 12/05/17  2:12 PM   Result Value Ref Range    WBC 7.1 3.6 - 11.0 K/uL    RBC 4.86 3.80 - 5.20 M/uL    HGB 12.5 11.5 - 16.0 g/dL    HCT 37.3 35.0 - 47.0 %    MCV 76.7 (L) 80.0 - 99.0 FL    MCH 25.7 (L) 26.0 - 34.0 PG    MCHC 33.5 30.0 - 36.5 g/dL    RDW 14.3 11.5 - 14.5 %    PLATELET 744 672 - 362 K/uL    NEUTROPHILS 40 32 - 75 %    LYMPHOCYTES 40 12 - 49 %    MONOCYTES 17 (H) 5 - 13 %    EOSINOPHILS 2 0 - 7 %    BASOPHILS 1 0 - 1 %    ABS. NEUTROPHILS 3.0 1.8 - 8.0 K/UL    ABS. LYMPHOCYTES 2.8 0.8 - 3.5 K/UL    ABS. MONOCYTES 1.2 (H) 0.0 - 1.0 K/UL    ABS. EOSINOPHILS 0.1 0.0 - 0.4 K/UL    ABS.  BASOPHILS 0.0 0.0 - 0.1 K/UL   METABOLIC PANEL, COMPREHENSIVE    Collection Time: 12/05/17  2:12 PM   Result Value Ref Range    Sodium 137 136 - 145 mmol/L    Potassium 4.3 3.5 - 5.1 mmol/L    Chloride 104 97 - 108 mmol/L    CO2 21 21 - 32 mmol/L    Anion gap 12 5 - 15 mmol/L    Glucose 82 65 - 100 mg/dL    BUN 12 6 - 20 MG/DL    Creatinine 0.64 0.55 - 1.02 MG/DL    BUN/Creatinine ratio 19 12 - 20      GFR est AA >60 >60 ml/min/1.73m2    GFR est non-AA >60 >60 ml/min/1.73m2    Calcium 9.4 8.5 - 10.1 MG/DL    Bilirubin, total 0.4 0.2 - 1.0 MG/DL    ALT (SGPT) 29 12 - 78 U/L    AST (SGOT) 24 15 - 37 U/L    Alk. phosphatase 62 45 - 117 U/L    Protein, total 7.5 6.4 - 8.2 g/dL    Albumin 3.9 3.5 - 5.0 g/dL    Globulin 3.6 2.0 - 4.0 g/dL    A-G Ratio 1.1 1.1 - 2.2     WET PREP    Collection Time: 12/05/17  2:12 PM   Result Value Ref Range    Clue cells CLUE CELLS PRESENT      Wet prep NO TRICHOMONAS SEEN     KOH, OTHER SOURCES    Collection Time: 12/05/17  2:12 PM   Result Value Ref Range    Special Requests: NO SPECIAL REQUESTS      KOH NO YEAST SEEN     BETA HCG, QT    Collection Time: 12/05/17  2:12 PM   Result Value Ref [place] [address] (H) 0 - 6 MIU/ML       IMAGING RESULTS:  US PREG UTS < 14 WKS SNGL   Final Result      US UTS TRANSVAGINAL OB   Final Result          MEDICATIONS GIVEN:  Medications - No data to display    IMPRESSION:  1. Pregnancy test performed, pregnancy confirmed        PLAN:  1. Current Discharge Medication List      START taking these medications    Details   [id number] fum-folic acid-dss 29 mg iron- 1 mg-25 mg tab Take 1 Tab by mouth daily. Qty: 20 Tab, Refills: 0           2.    Follow-up Information     Follow up With Details Comments Contact Info    Mita Turner MD  Follow up for [facility] care [address] [place] [address]  [phone]          Return to ED if worse

## 2017-12-06 LAB
C TRACH DNA SPEC QL NAA+PROBE: NEGATIVE
N GONORRHOEA DNA SPEC QL NAA+PROBE: NEGATIVE
SAMPLE TYPE: NORMAL
SERVICE CMNT-IMP: NORMAL
SPECIMEN SOURCE: NORMAL

## 2017-12-07 LAB
BACTERIA SPEC CULT: ABNORMAL
CC UR VC: ABNORMAL
SERVICE CMNT-IMP: ABNORMAL

## 2017-12-08 ENCOUNTER — HOSPITAL ENCOUNTER (EMERGENCY)
Age: 32
Discharge: HOME | End: 2017-12-08
Attending: EMERGENCY MEDICINE
Payer: COMMERCIAL

## 2017-12-08 VITALS
BODY MASS INDEX: 19.97 KG/M2 | HEART RATE: 79 BPM | DIASTOLIC BLOOD PRESSURE: 67 MMHG | OXYGEN SATURATION: 100 % | HEIGHT: 64 IN | WEIGHT: 117 LBS | RESPIRATION RATE: 16 BRPM | TEMPERATURE: 98.7 F | SYSTOLIC BLOOD PRESSURE: 105 MMHG

## 2017-12-08 DIAGNOSIS — O23.41 URINARY TRACT INFECTION IN MOTHER DURING FIRST TRIMESTER OF PREGNANCY: Primary | ICD-10-CM

## 2017-12-08 DIAGNOSIS — O21.0 HYPEREMESIS ARISING DURING PREGNANCY: ICD-10-CM

## 2017-12-08 PROCEDURE — 99282 EMERGENCY DEPT VISIT SF MDM: CPT

## 2017-12-08 RX ORDER — CEPHALEXIN 500 MG/1
500 CAPSULE ORAL 2 TIMES DAILY
Qty: 14 CAP | Refills: 0 | Status: SHIPPED | OUTPATIENT
Start: 2017-12-08 | End: 2017-12-15

## 2017-12-08 RX ORDER — ONDANSETRON 4 MG/1
4 TABLET, ORALLY DISINTEGRATING ORAL
Qty: 15 TAB | Refills: 0 | Status: SHIPPED | OUTPATIENT
Start: 2017-12-08

## 2017-12-08 NOTE — ED NOTES
Emergency Department Nursing Plan of Care       The Nursing Plan of Care is developed from the Nursing assessment and Emergency Department Attending provider initial evaluation. The plan of care may be reviewed in the ED Provider note.     The Plan of Care was developed with the following considerations:   Patient / Family readiness to learn indicated by:verbalized understanding  Persons(s) to be included in education: patient  Barriers to Learning/Limitations:No    Signed     Yaima Gregorio    12/8/2017   6:19 PM

## 2017-12-08 NOTE — LETTER
[facility] EMERGENCY DEPT 
[address] [place] [address] 
[phone] Work/School Note Date: 12/8/2017 To Whom It May concern: 
 
Fang Singer was seen and treated today in the emergency room by the following provider(s): 
Attending Provider: Caitlin Sanders MD 
Physician Assistant: Carol Mohamud PA-C. Fang Singer may return to work on 12/10/17 unless feeling better prior to 12/10.  
 
Sincerely, 
 
 
 
Yoanna Mcnulty RN

## 2017-12-08 NOTE — ED PROVIDER NOTES
Patient is a 33 y.o. female presenting with hematuria. The history is provided by the patient. Blood in Urine    This is a new problem. Episode onset: pt seen here 3 days ago for similiar complaints and found out she was pregnant. Pt states \"my kidney's hurt\" and she reports blood in urine. LMP 10/4. 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 The problem occurs every urination. The problem has not changed since onset. The pain is at a severity of 0/10. The patient is experiencing no pain. There has been no fever. She is sexually active. Associated symptoms include nausea, vomiting, frequency and hematuria. Pertinent negatives include no vaginal discharge. Yes, the patient is pregnant. She has tried nothing (pt states she was only given prenatal vitamins) for the symptoms. Past Medical History:   Diagnosis Date    Kidney disorder        Past Surgical History:   Procedure Laterality Date    HX GYN      D and C         History reviewed. No pertinent family history. Social History     Social History    Marital status: SINGLE     Spouse name: N/A    Number of children: N/A    Years of education: N/A     Occupational History    Not on file. Social History Main Topics    Smoking status: Current Some Day Smoker     Packs/day: 0.25     Types: Cigarettes    Smokeless tobacco: Never Used      Comment: 2-3 black and milds daily    Alcohol use No    Drug use: No    Sexual activity: Not on file     Other Topics Concern    Not on file     Social History Narrative         ALLERGIES: Soap    Review of Systems   Gastrointestinal: Positive for nausea and vomiting. Genitourinary: Positive for frequency and hematuria. Negative for vaginal bleeding and vaginal discharge. Musculoskeletal: Negative. All other systems reviewed and are negative.       Vitals:    17 1756   BP: 105/67   Pulse: 79   Resp: 16   Temp: 98.7 °F (37.1 °C)   SpO2: 100%   Weight: 53.1 kg (117 lb)   Height: 5' 4\" (1.626 m)            Physical Exam Constitutional: She is oriented to person, place, and time. She appears well-developed and well-nourished. No distress. HENT:   Head: Normocephalic and atraumatic. Eyes: Conjunctivae are normal.   Cardiovascular: Normal rate, regular rhythm and normal heart sounds. Pulmonary/Chest: Effort normal and breath sounds normal. No respiratory distress. She has no wheezes. She has no rales. Abdominal: Soft. Bowel sounds are normal. She exhibits no distension. There is no tenderness. There is no rebound and no guarding. Neurological: She is alert and oriented to person, place, and time. Skin: Skin is warm and dry. Psychiatric: She has a normal mood and affect. Her behavior is normal. Judgment and thought content normal.   Nursing note and vitals reviewed. at 6:45 PM      MDM  Number of Diagnoses or Management Options  Hyperemesis arising during pregnancy:   Urinary tract infection in mother during first trimester of pregnancy:      Amount and/or Complexity of Data Reviewed  Review and summarize past medical records: yes (Reviewed labs 3 days ago and had a positive UA, no abx given at that time so will treat accordingly today with no further w/u as pt had normal IUP U/S 3 days ago. )      ED Course       Procedures    PROGRESS NOTE:  A/P  6:30 PM  Reviewed previous lab results with pt. The patient's signs, symptoms, diagnosis, and discharge instruction have been discussed and the patient has conveyed their understanding. The patient is to follow up with OB as scheduled or return to the ER should their symptoms worsen. Plan has been discussed and the patient is in agreement. Pt is ready for discharge      DIAGNOSIS:  1. Urinary tract infection in mother during first trimester of pregnancy    2.  Hyperemesis arising during pregnancy        PLAN:  Follow-up Information     Follow up With Details Comments MD Darci Troncoso [address] [place] [address]  [phone] Discharge Medication List as of 12/8/2017  6:28 PM      START taking these medications    Details   ondansetron (ZOFRAN ODT) 4 mg disintegrating tablet Take 1 Tab by mouth every eight (8) hours as needed for Nausea., Normal, Disp-15 Tab, R-0      cephALEXin (KEFLEX) 500 mg capsule Take 1 Cap by mouth two (2) times a day for 7 days. , Normal, Disp-14 Cap, R-0         CONTINUE these medications which have NOT CHANGED    Details   [id number] fum-folic acid-dss 29 mg iron- 1 mg-25 mg tab Take 1 Tab by mouth daily. , Normal, Disp-20 Tab, R-0

## 2017-12-26 ENCOUNTER — OFFICE VISIT (OUTPATIENT)
Dept: OBGYN CLINIC | Age: 32
End: 2017-12-26

## 2017-12-26 VITALS
WEIGHT: 111 LBS | DIASTOLIC BLOOD PRESSURE: 62 MMHG | HEART RATE: 85 BPM | BODY MASS INDEX: 18.95 KG/M2 | HEIGHT: 64 IN | SYSTOLIC BLOOD PRESSURE: 100 MMHG

## 2017-12-26 DIAGNOSIS — R31.9 HEMATURIA, UNSPECIFIED TYPE: ICD-10-CM

## 2017-12-26 DIAGNOSIS — N92.6 MISSED MENSES: Primary | ICD-10-CM

## 2017-12-26 DIAGNOSIS — Z32.01 POSITIVE PREGNANCY TEST: ICD-10-CM

## 2017-12-26 PROBLEM — Z34.90 PREGNANCY: Status: ACTIVE | Noted: 2017-12-26

## 2017-12-26 NOTE — PROGRESS NOTES
Current pregnancy history: (NEW PATIENT)    Yoko Mccabe is a ,  33 y.o. female [address] Patient's last menstrual period was 10/04/2017 (exact date). .  She presents for the evaluation of amenorrhea and a positive pregnancy test.    LMP history:  The date of her LMP is certain. Her last menstrual period was normal and lasted for 4 to 5 days. A urine pregnancy test was positive on 17. She was not on the pill at conception. Based on her LMP, her EDC is 18 and her EGA is 11 weeks,6 days. Her menstrual cycles are regular and occur approximately every 28 days  and range from 3 to 5 days. The last menses lasted 4-5 the usual number of days. Ultrasound data:  She had an  ultrasound done by the ultrasound tech today which revealed a viable tariq pregnancy with a gestational age of 16 weeks and 1 days giving an [address] of 18. TA ULTRASOUND PERFORMED. A SINGLE VIABLE 12W1D IUP IS SEEN WITH NORMAL CARDIAC RHYTHM. GESTATIONAL AGE BASED ON TODAYS US.  A NORMAL APPEARING YOLK [address] IS SEEN. RIGHT & LEFT ADNEXA APPEAR WITHIN NORMAL LIMITS. NO FREE FLUID SEEN IN THE CDS. Pregnancy symptoms:    Since her LMP she has experienced  urinary frequency, breast tenderness, and nausea. She has been vomiting over the last few weeks. Associated signs and symptoms which she denies: dysuria, discharge, vaginal bleeding. She states she has LOST weight:  Approximately 5 pounds over the last few weeks. Relevant past pregnancy history:   She has the following pregnancy history: C/S d/t Breech presentation   She has no history of  delivery. Relevant past medical history:(relevant to this pregnancy): noncontributory. Pap/Occupational history:  Last pap smear: last year Results: Normal      Her occupation is: Mental health counselor, works with adults. Substance history: negative for alcohol, tobacco and street drugs. Positive for nothing.   Exposure history: There are no indoor cats in the home. The patient was instructed to not change the cat litter. She denies close contact with children on a regular basis. She has had chicken pox or the vaccine in the past.   Patient denies issues with domestic violence. Genetic Screening/Teratology Counseling: (Includes patient, baby's father, or anyone in either family with:)  3.  Patient's age >/= 28 at [facility]?-- no  .   2. Thalassemia ([facility], [facility], [address] [facility], or  background): MCV<80?--no.     3.  Neural tube defect (meningomyelocele, spina bifida, anencephaly)?--no.   4.  Congenital heart defect?--no.  5.  Down syndrome?--no.   6.  Giles-Sachs ([place], [facility])?--no.   7.  Canavan's Disease?--no.   8.  Familial Dysautonomia?--no.   9.  Sickle cell disease or trait ()? --no   The patient has not been tested for sickle trait  10. Hemophilia or other blood disorders?--no. 11.  Muscular dystrophy?--no. 12.  Cystic fibrosis?--no. 13.  Routt's Chorea?--no. 14.  Mental retardation/autism (if yes was person tested for Fragile X)?--no. 15.  Other inherited genetic or chromosomal disorder?--no. 12.  Maternal metabolic disorder (DM, PKU, etc)?--no. 17.  Patient or FOB with a child with a birth defect not listed above?--no.  17a. Patient or FOB with a birth defect themselves?--no. 18.  Recurrent pregnancy loss, or stillbirth?--no. 19.  Any medications since LMP other than prenatal vitamins (include vitamins, supplements, OTC meds, drugs, alcohol)?--no. 20.  Any other genetic/environmental exposure to discuss?--no. Infection History:  1. Lives with someone with TB or TB exposed?--no.   2.  Patient or partner has history of genital herpes?--no.  3.  Rash or viral illness since LMP?--no.    4.  History of STD (GC, CT, HPV, syphilis, HIV)? --no   5. Other: OTHER?       Past Medical History:   Diagnosis Date    Kidney disorder      Past Surgical History:   Procedure Laterality Date  HX  SECTION  2016    HX GYN      D and C     Social History     Occupational History    Not on file. Social History Main Topics    Smoking status: Current Some Day Smoker     Packs/day: 0.25     Types: Cigarettes    Smokeless tobacco: Never Used      Comment: Now smoking 1/2 black and milds - last was a wk ago    Alcohol use No    Drug use: No    Sexual activity: Yes     Partners: Male     Birth control/ protection: None     History reviewed. No pertinent family history. OB History    Para Term  AB Living   3 1 1  1 1   SAB TAB Ectopic Molar Multiple Live Births        1      # Outcome Date GA Lbr Chris/2nd Weight Sex Delivery Anes PTL Lv   3 Current            2 Term 16 40w0d   F CS-Unspec Spinal N JOEL      Complications: Breech presentation   1 AB  8w0d             Birth Comments: D&C performed         Allergies   Allergen Reactions    Soap Hives     Prior to Admission medications    Medication Sig Start Date End Date Taking? Authorizing Provider   ondansetron (ZOFRAN ODT) 4 mg disintegrating tablet Take 1 Tab by mouth every eight (8) hours as needed for Nausea. 17  Yes Spencer Thomson PA-C   [id number] fum-folic acid-dss 29 mg iron- 1 mg-25 mg tab Take 1 Tab by mouth daily.  17  Yes IMMANUEL Miguel        Review of Systems: History obtained from the patient  Constitutional: negative for weight loss, fever, night sweats  HEENT: negative for hearing loss, earache, congestion, snoring, sore throat  CV: negative for chest pain, palpitations, edema  Resp: negative for cough, shortness of breath, wheezing  Breast: negative for breast lumps, nipple discharge, galactorrhea  GI: negative for change in bowel habits, abdominal pain, black or bloody stools  : negative for frequency, dysuria, hematuria, vaginal discharge  MSK: negative for back pain, joint pain, muscle pain  Skin: negative for itching, rash, hives  Neuro: negative for dizziness, headache, confusion, weakness  Psych: negative for anxiety, depression, change in mood  Heme/lymph: negative for bleeding, bruising, pallor    Objective:  Visit Vitals    /62    Pulse 85    Ht 5' 4\" (1.626 m)    Wt 111 lb (50.3 kg)    LMP 10/04/2017 (Exact Date)    BMI 19.05 kg/m2       Physical Exam:     Constitutional  · Appearance: well-nourished, well developed, alert, in no acute distress    HENT  · Head  · Face: appears normal  · Eyes: appear normal  · Ears: normal  · Mouth: normal  · Lips: no lesions    Neck  · Inspection/Palpation: normal appearance, no masses or tenderness  · Lymph Nodes: no lymphadenopathy present  · Thyroid: gland size normal, nontender, no nodules or masses present on palpation    Chest  · Respiratory Effort: breathing unlabored  · Auscultation: normal breath sounds    Cardiovascular  · Heart:  · Auscultation: regular rate and rhythm without murmur    Breasts  · Inspection of Breasts: breasts symmetrical, no skin changes, no discharge present, nipple appearance normal, no skin retraction present  · Palpation of Breasts and Axillae: no masses present on palpation, no breast tenderness  · Axillary Lymph Nodes: no lymphadenopathy present    Gastrointestinal  · Abdominal Examination: abdomen non-tender to palpation, normal bowel sounds, no masses present  · Liver and spleen: no hepatomegaly present, spleen not palpable  · Hernias: no hernias identified    Genitourinary  · External Genitalia: normal appearance for age, no discharge present, no tenderness present, no inflammatory lesions present, no masses present, no atrophy present  · Vagina: normal vaginal vault without central or paravaginal defects, no discharge present, no inflammatory lesions present, no masses present  · Bladder: non-tender to palpation  · Urethra: appears normal  · Cervix: normal   · Uterus: enlarged, normal shape, soft  · Adnexa: no adnexal tenderness present, no adnexal masses present  · Perineum: perineum within normal limits, no evidence of trauma, no rashes or skin lesions present  · Anus: anus within normal limits, no hemorrhoids present  · Inguinal Lymph Nodes: no lymphadenopathy present    Skin  · General Inspection: no rash, no lesions identified    Neurologic/Psychiatric  · Mental Status:  · Orientation: grossly oriented to person, place and time  · Mood and Affect: mood normal, affect appropriate    Assessment:   Intrauterine pregnancy with the following problems identified: H/O C/S, H/O renal stones and now gross hematuria. Plan:     Offered CF testing, CVS, Nuchal Translucency, MSAFP, amnio, and discussed NIPT  Course of pregnancy discussed including visit schedule, routine U/S, glucola testing, etc.  Avoid alcoholic beverages and illicit/recreational drugs use  Take prenatal vitamins or folic acid daily. Hospital and practice style discussed with coverage system. Discussed nutrition, toxoplasmosis precautions, sexual activity, exercise, need for influenza vaccine, environmental and work hazards, travel advice, screen for domestic violence, need for seat belts. Discussed seafood, unpasteurized dairy products, deli meat, artificial sweeteners, and caffeine. Information on prenatal classes/breastfeeding given. Information on circumcision given  Patient encouraged not to smoke. Discussed current prescription drug use. Given medication list.  Discussed the use of over the counter medications and chemicals. Route of delivery discussed, including risks, benefits, and alternatives of  versus repeat LTCS. She desires repeat C/S. Pt understands risk of hemorrhage during pregnancy and post delivery and would accept blood products if necessary in life-threatening emergencies    Handouts given to pt. She will get renal US and see Urology. [facility]

## 2017-12-26 NOTE — MR AVS SNAPSHOT
Visit Information Date & Time Provider Department Dept. Phone Encounter #  
 12/26/2017  2:00 PM Piotr Gilmore MD Vincent Corey [phone] [id number] Upcoming Health Maintenance Date Due  
 PAP AKA CERVICAL CYTOLOGY 2/3/2007 Influenza Age 5 to Adult 8/1/2017 Allergies as of 12/26/2017  Review Complete On: 12/26/2017 By: Jaret Harris Severity Noted Reaction Type Reactions Soap  01/03/2013    Hives Current Immunizations  Reviewed on 3/29/2017 Name Date  
 TB Skin Test (PPD) Intradermal 3/29/2017 Not reviewed this visit You Were Diagnosed With   
  
 Codes Comments Missed menses    -  Primary ICD-10-CM: N92.6 ICD-9-CM: 626.4 Positive pregnancy test     ICD-10-CM: Z32.01 
ICD-9-CM: V72.42 Vitals BP Pulse Height(growth percentile) Weight(growth percentile) LMP BMI  
 100/62 85 5' 4\" (1.626 m) 111 lb (50.3 kg) 10/04/2017 (Exact Date) 19.05 kg/m2 OB Status Smoking Status Pregnant Current Some Day Smoker BMI and BSA Data Body Mass Index Body Surface Area 19.05 kg/m 2 1.51 m 2 Preferred Pharmacy Pharmacy Name Phone [facility] [address] , [address] [place] [address] [phone] Your Updated Medication List  
  
   
This list is accurate as of: 12/26/17  3:13 PM.  Always use your most recent med list.  
  
  
  
  
 ondansetron 4 mg disintegrating tablet Commonly known as:  ZOFRAN ODT Take 1 Tab by mouth every eight (8) hours as needed for Nausea. [id number] fum-folic acid-dss 29 mg iron- 1 mg-25 mg Tab Take 1 Tab by mouth daily. Patient Instructions Weeks 10 to 14 of Your Pregnancy: Care Instructions Your Care Instructions By weeks 10 to 15 of your pregnancy, the placenta has formed inside your uterus.  It is possible to hear your baby's heartbeat with a special ultrasound device. Your baby's eyes can and do move. The arms and legs can bend. This is a good time to think about testing for birth defects. There are two types of tests: screening and diagnostic. Screening tests show the chance that a baby has a certain birth defect. They can't tell you for sure that your baby has a problem. Diagnostic tests show if a baby has a certain birth defect. It's your choice whether to have these tests. You and your partner can talk to your doctor or midwife about birth defects tests. Follow-up care is a key part of your treatment and safety. Be sure to make and go to all appointments, and call your doctor if you are having problems. It's also a good idea to know your test results and keep a list of the medicines you take. How can you care for yourself at home? Decide about tests · You can have screening tests and diagnostic tests to check for birth defects. The decision to have a test for birth defects is personal. Think about your age, your chance of passing on a family disease, your need to know about any problems, and what you might do after you have the test results. ¨ Triple or quadruple (quad) blood tests. These screening tests can be done between 15 and 20 weeks of pregnancy. They check the amounts of three or four substances in your blood. The doctor looks at these test results, along with your age and other factors, to find out the chance that your baby may have certain problems. ¨ Amniocentesis. This diagnostic test is used to look for chromosomal problems in the baby's cells. It can be done between 15 and 20 weeks of pregnancy, usually around week 16. 
¨ Nuchal translucency test. This test uses ultrasound to measure the thickness of the area at the back of the baby's neck. An increase in the thickness can be an early sign of Down syndrome. ¨ Chorionic villus sampling ([facility]).  This is a test that looks for certain genetic problems with your baby. The same genes that are in your baby are in the placenta. A small piece of the placenta is taken out and tested. This test is done when you are 10 to 13 weeks pregnant. Ease discomfort · Slow down and take naps when you feel tired. · If your emotions swing, talk to someone. Crying, anxiety, and concentration problems are common. · If your gums bleed, try a softer toothbrush. If your gums are puffy and bleed a lot, see your dentist. 
· If you feel dizzy: ¨ Get up slowly after sitting or lying down. ¨ Drink plenty of fluids. ¨ Eat small snacks to keep your blood sugar stable. ¨ Put your head between your legs as though you were tying your shoelaces. ¨ Lie down with your legs higher than your head. Use pillows to prop up your feet. · If you have a headache: 
¨ Lie down. ¨ Ask your partner or a good friend for a neck massage. ¨ Try cool cloths over your forehead or across the back of your neck. ¨ Use acetaminophen (Tylenol) for pain relief. Do not use nonsteroidal anti-inflammatory drugs (NSAIDs), such as ibuprofen (Advil, Motrin) or naproxen (Aleve), unless your doctor says it is okay. · If you have a nosebleed, pinch your nose gently, and hold it for a short while. To prevent nosebleeds, try massaging a small dab of petroleum jelly, such as Vaseline, in your nostrils. · If your nose is stuffed up, try saline (saltwater) nose sprays. Do not use decongestant sprays. Care for your breasts · Wear a bra that gives you good support. · Know that changes in your breasts are normal. 
¨ Your breasts may get larger and more tender. Tenderness usually gets better by 12 weeks. ¨ Your nipples may get darker and larger, and small bumps around your nipples may show more. ¨ The veins in your chest and breasts may show more. · Don't worry about \"toughening'\" your nipples. Breastfeeding will naturally do this. Where can you learn more? Go to http://rl-lavon.info/. Enter I314 in the search box to learn more about \"Weeks 10 to 14 of Your Pregnancy: Care Instructions. \" Current as of: March 16, 2017 Content Version: 11.4 © 2142-8585 Healthwise, Incorporated. Care instructions adapted under license by [employer] (which disclaims liability or warranty for this information). If you have questions about a medical condition or this instruction, always ask your healthcare professional. [address] any warranty or liability for your use of this information. Introducing [facility]! [employer] introduces [employer] patient portal. Now you can access parts of your medical record, email your doctor's office, and request medication refills online. 1. In your internet browser, go to [email or web address] 2. Click on the First Time User? Click Here link in the Sign In box. You will see the New Member Sign Up page. 3. Enter your [employer] Access Code exactly as it appears below. You will not need to use this code after youve completed the sign-up process. If you do not sign up before the expiration date, you must request a new code. · [employer] Access Code: [email or web address] Expires: 2/21/2018  6:06 PM 
 
4. Enter the last four digits of your Social Security Number (xxxx) and Date of Birth (mm/dd/yyyy) as indicated and click Submit. You will be taken to the next sign-up page. 5. Create a Spayeet ID. This will be your [employer] login ID and cannot be changed, so think of one that is secure and easy to remember. 6. Create a [employer] password. You can change your password at any time. 7. Enter your Password Reset Question and Answer. This can be used at a later time if you forget your password. 8. Enter your e-mail address. You will receive e-mail notification when new information is available in [address] 9. Click Sign Up. You can now view and download portions of your medical record. 10. Click the Download Summary menu link to download a portable copy of your medical information. If you have questions, please visit the Frequently Asked Questions section of the [employer] website. Remember, [employer] is NOT to be used for urgent needs. For medical emergencies, dial 911. Now available from your iPhone and Android! Please provide this summary of care documentation to your next provider. Your primary care clinician is listed as NIKOS Balderas . If you have any questions after today's visit, please call [phone].

## 2017-12-26 NOTE — PATIENT INSTRUCTIONS

## 2017-12-28 LAB
BACTERIA UR CULT: NO GROWTH
HBV SURFACE AG SERPL QL IA: NEGATIVE
HGB A MFR BLD: 97.7 % (ref 96.4–98.8)
HGB A2 MFR BLD COLUMN CHROM: 2.3 % (ref 1.8–3.2)
HGB C MFR BLD: 0 %
HGB F MFR BLD: 0 % (ref 0–2)
HGB FRACT BLD-IMP: NORMAL
HGB OTHER MFR BLD HPLC: 0 %
HGB S BLD QL SOLY: NEGATIVE
HGB S MFR BLD: 0 %
HIV 1+2 AB+HIV1 P24 AG SERPL QL IA: NON REACTIVE
RUBV IGG SERPL IA-ACNC: 1.66 INDEX
T PALLIDUM AB SER QL IA: NEGATIVE

## 2017-12-29 LAB
CYTOLOGIST CVX/VAG CYTO: ABNORMAL
CYTOLOGY CVX/VAG DOC THIN PREP: ABNORMAL
DX ICD CODE: ABNORMAL
DX ICD CODE: ABNORMAL
HPV I/H RISK 1 DNA CVX QL PROBE+SIG AMP: POSITIVE
Lab: ABNORMAL
OTHER STN SPEC: ABNORMAL
PATH REPORT.FINAL DX SPEC: ABNORMAL
PATHOLOGIST CVX/VAG CYTO: ABNORMAL
STAT OF ADQ CVX/VAG CYTO-IMP: ABNORMAL

## 2018-01-12 NOTE — PROGRESS NOTES
Patient advised of MD reviewed lab results and recommendations. Patient placed on the schedule to have procedure done on 1/22/18 at 2:00PM.(ok per [facility])  Patient verbalized understanding.

## 2018-01-16 ENCOUNTER — TELEPHONE (OUTPATIENT)
Dept: OBGYN CLINIC | Age: 33
End: 2018-01-16

## 2018-03-13 ENCOUNTER — TELEPHONE (OUTPATIENT)
Dept: OBGYN CLINIC | Age: 33
End: 2018-03-13

## 2019-05-21 ENCOUNTER — HOSPITAL ENCOUNTER (EMERGENCY)
Age: 34
Discharge: HOME | End: 2019-05-21
Attending: EMERGENCY MEDICINE
Payer: COMMERCIAL

## 2019-05-21 VITALS
HEART RATE: 90 BPM | OXYGEN SATURATION: 97 % | TEMPERATURE: 97.8 F | DIASTOLIC BLOOD PRESSURE: 68 MMHG | WEIGHT: 118 LBS | RESPIRATION RATE: 16 BRPM | BODY MASS INDEX: 20.14 KG/M2 | HEIGHT: 64 IN | SYSTOLIC BLOOD PRESSURE: 117 MMHG

## 2019-05-21 DIAGNOSIS — H00.024 HORDEOLUM INTERNUM OF LEFT UPPER EYELID: Primary | ICD-10-CM

## 2019-05-21 PROCEDURE — 99282 EMERGENCY DEPT VISIT SF MDM: CPT

## 2019-05-21 RX ORDER — ERYTHROMYCIN 5 MG/G
OINTMENT OPHTHALMIC
Qty: 1 G | Refills: 0 | Status: SHIPPED | OUTPATIENT
Start: 2019-05-21 | End: 2019-05-28

## 2019-05-21 NOTE — ED NOTES
Patient presents to the ED with c/o left eye pain, redness and swelling x5 days. Pt reports using warm compresses and denies taking any medications. Pt is alert and oriented. Pt skin is warm and dry. Pt is ambulatory independently. Emergency Department Nursing Plan of Care       The Nursing Plan of Care is developed from the Nursing assessment and Emergency Department Attending provider initial evaluation. The plan of care may be reviewed in the ED Provider note.     The Plan of Care was developed with the following considerations:   Patient / Family readiness to learn indicated by:verbalized understanding  Persons(s) to be included in education: patient  Barriers to Learning/Limitations:No    Signed     Joe Lozano    5/21/2019   11:26 AM

## 2019-05-21 NOTE — ED PROVIDER NOTES
EMERGENCY DEPARTMENT HISTORY AND PHYSICAL EXAM    Date: 2019  Patient Name: Torie Campa    History of Presenting Illness     Chief Complaint   Patient presents with    Eye Pain     left eye pain x 4-5 days         History Provided By: Patient      HPI: Torie Campa is a 36 y.o. female with a PMH of No significant past medical history who presents with eye pain. Onset 4 days ago. Located on left eye. Associated with swelling, bump, redness. Denies drainage, vision changes, itching. States initially started as a bump gradual swelling as of yesterday. She also has pain. Has not tried anything for symptoms. Denies eye makeup use or eyelash extension use. PCP: Ronel Cedeno NP    Current Outpatient Medications   Medication Sig Dispense Refill    erythromycin (ILOTYCIN) ophthalmic ointment Apply 1/2 inch to left eyelid every 6 hours for 7 days 1 g 0    ondansetron (ZOFRAN ODT) 4 mg disintegrating tablet Take 1 Tab by mouth every eight (8) hours as needed for Nausea. 15 Tab 0    [id number] fum-folic acid-dss 29 mg iron- 1 mg-25 mg tab Take 1 Tab by mouth daily. 20 Tab 0       Past History     Past Medical History:  Past Medical History:   Diagnosis Date    Kidney disorder        Past Surgical History:  Past Surgical History:   Procedure Laterality Date    HX  SECTION  2016    HX GYN      D and C       Family History:  History reviewed. No pertinent family history. Social History:  Social History     Tobacco Use    Smoking status: Current Some Day Smoker     Packs/day: 0.25     Types: Cigarettes    Smokeless tobacco: Never Used    Tobacco comment: Now smoking 1/2 black and milds - last was a wk ago   Substance Use Topics    Alcohol use: No    Drug use: No       Allergies: Allergies   Allergen Reactions    Soap Hives         Review of Systems   Review of Systems   Constitutional: Negative for chills and fever. HENT: Negative for congestion, ear pain and rhinorrhea. Eyes: Positive for pain and redness. Negative for photophobia, discharge, itching and visual disturbance. + eye swelling    Respiratory: Negative for cough. Cardiovascular: Negative for chest pain. Musculoskeletal: Negative for arthralgias. Skin: Negative for rash. Neurological: Negative for headaches. All other systems reviewed and are negative. Physical Exam     Vitals:    05/21/19 1103   BP: 117/68   Pulse: 90   Resp: 16   Temp: 97.8 °F (36.6 °C)   SpO2: 97%   Weight: 53.5 kg (118 lb)   Height: 5' 4\" (1.626 m)     Physical Exam   Constitutional: She appears well-developed and well-nourished. No distress. HENT:   Head: Normocephalic and atraumatic. Mouth/Throat: Uvula is midline, oropharynx is clear and moist and mucous membranes are normal.   Eyes: Pupils are equal, round, and reactive to light. Conjunctivae and EOM are normal. Right eye exhibits no discharge, no exudate and no hordeolum. Left eye exhibits hordeolum (interna, upper eyelid with swelling and erythema). Left eye exhibits no discharge. Neck: Normal range of motion. Neck supple. Cardiovascular: S1 normal, S2 normal and normal heart sounds. Pulmonary/Chest: Effort normal and breath sounds normal. She has no wheezes. She has no rhonchi. Nursing note and vitals reviewed. Diagnostic Study Results     Labs -   No results found for this or any previous visit (from the past 12 hour(s)). Radiologic Studies -   No orders to display     CT Results  (Last 48 hours)    None        CXR Results  (Last 48 hours)    None            Medical Decision Making   I am the first provider for this patient. I reviewed the vital signs, available nursing notes, past medical history, past surgical history, family history and social history. Vital Signs-Reviewed the patient's vital signs.     Records Reviewed: Nursing Notes and Old Medical Records            Disposition:  Discharge     DISCHARGE NOTE:         Care plan outlined and precautions discussed. Patient has no new complaints, changes, or physical findings. All medications were reviewed with the patient; will d/c home with erythromycin ointment. All of pt's questions and concerns were addressed. Patient was instructed and agrees to follow up with PCP,  as well as to return to the ED upon further deterioration. Patient is ready to go home. Follow-up Information     Follow up With Specialties Details Why Contact Info    Darlin Bonner, NP Nurse Practitioner Schedule an appointment as soon as possible for a visit If symptoms worsen [place] [address] [address] [place]  [phone]            Discharge Medication List as of 5/21/2019 12:45 PM      START taking these medications    Details   erythromycin (ILOTYCIN) ophthalmic ointment Apply 1/2 inch to left eyelid every 6 hours for 7 days, Normal, Disp-1 g, R-0         CONTINUE these medications which have NOT CHANGED    Details   ondansetron (ZOFRAN ODT) 4 mg disintegrating tablet Take 1 Tab by mouth every eight (8) hours as needed for Nausea., Normal, Disp-15 Tab, R-0      [id number] fum-folic acid-dss 29 mg iron- 1 mg-25 mg tab Take 1 Tab by mouth daily. , Normal, Disp-20 Tab, R-0             Provider Notes (Medical Decision Making):   DDX: Hordeolum, chalazion, eyelid nodule    Procedures:  Procedures        Diagnosis     Clinical Impression:   1. Hordeolum externum of left upper eyelid    2.  Hordeolum internum of left upper eyelid

## 2019-05-21 NOTE — DISCHARGE INSTRUCTIONS
Patient Education        Styes and Chalazia: Care Instructions  Your Care Instructions    Styes and chalazia (say \"svw-YWG-mob-uh\") are both conditions that can cause swelling of the eyelid. A stye is an infection in the root of an eyelash. The infection causes a tender red lump on the edge of the eyelid. The infection can spread until the whole eyelid becomes red and inflamed. Styes usually break open, and a tiny amount of pus drains. They usually clear up on their own in about a week, but they sometimes need treatment with antibiotics. A chalazion is a lump or cyst in the eyelid (chalazion is singular; chalazia is plural). It is caused by swelling and inflammation of deep oil glands inside the eyelid. Chalazia are usually not infected. They can take a few months to heal.  If a chalazion becomes more swollen and painful or does not go away, you may need to have it drained by your doctor. Follow-up care is a key part of your treatment and safety. Be sure to make and go to all appointments, and call your doctor if you are having problems. It's also a good idea to know your test results and keep a list of the medicines you take. How can you care for yourself at home? · Do not rub your eyes. Do not squeeze or try to open a stye or chalazion. · To help a stye or chalazion heal faster:  ? Put a warm, moist compress on your eye for 5 to 10 minutes, 3 to 6 times a day. Heat often brings a stye to a point where it drains on its own. Keep in mind that warm compresses will often increase swelling a little at first.  ? Do not use hot water or heat a wet cloth in a microwave oven. The compress may get too hot and can burn the eyelid. · Always wash your hands before and after you use a compress or touch your eyes. · If the doctor gave you antibiotic drops or ointment, use the medicine exactly as directed. Use the medicine for as long as instructed, even if your eye starts to feel better.   · To put in eyedrops or ointment:  ? Tilt your head back, and pull your lower eyelid down with one finger. ? Drop or squirt the medicine inside the lower lid. ? Close your eye for 30 to 60 seconds to let the drops or ointment move around. ? Do not touch the ointment or dropper tip to your eyelashes or any other surface. · Do not wear eye makeup or contact lenses until the stye or chalazion heals. · Do not share towels, pillows, or washcloths while you have a stye. When should you call for help? Call your doctor now or seek immediate medical care if:    · You have pain in your eye.     · You have a change in vision or loss of vision.     · Redness and swelling get much worse.    Watch closely for changes in your health, and be sure to contact your doctor if:    · Your stye does not get better in 1 week.     · Your chalazion does not start to get better after several weeks. Where can you learn more? Go to http://rl-lavon.info/. Enter V732 in the search box to learn more about \"Styes and Chalazia: Care Instructions. \"  Current as of: July 17, 2018  Content Version: 11.9  © 1122-2100 [employer]. Care instructions adapted under license by [employer] (which disclaims liability or warranty for this information). If you have questions about a medical condition or this instruction, always ask your healthcare professional. [place] [address] any warranty or liability for your use of this information.

## 2019-09-24 ENCOUNTER — APPOINTMENT (OUTPATIENT)
Dept: GENERAL RADIOLOGY | Age: 34
End: 2019-09-24
Attending: PHYSICIAN ASSISTANT
Payer: COMMERCIAL

## 2019-09-24 ENCOUNTER — HOSPITAL ENCOUNTER (EMERGENCY)
Age: 34
Discharge: HOME | End: 2019-09-24
Attending: EMERGENCY MEDICINE
Payer: COMMERCIAL

## 2019-09-24 VITALS
SYSTOLIC BLOOD PRESSURE: 117 MMHG | RESPIRATION RATE: 16 BRPM | HEIGHT: 64 IN | TEMPERATURE: 98.2 F | DIASTOLIC BLOOD PRESSURE: 66 MMHG | HEART RATE: 84 BPM | OXYGEN SATURATION: 100 % | WEIGHT: 117 LBS | BODY MASS INDEX: 19.97 KG/M2

## 2019-09-24 DIAGNOSIS — S90.122A CONTUSION OF LESSER TOE OF LEFT FOOT WITHOUT DAMAGE TO NAIL, INITIAL ENCOUNTER: Primary | ICD-10-CM

## 2019-09-24 PROCEDURE — 99283 EMERGENCY DEPT VISIT LOW MDM: CPT

## 2019-09-24 PROCEDURE — 74011250637 HC RX REV CODE- 250/637: Performed by: PHYSICIAN ASSISTANT

## 2019-09-24 PROCEDURE — 73630 X-RAY EXAM OF FOOT: CPT

## 2019-09-24 RX ORDER — ACETAMINOPHEN 500 MG
1000 TABLET ORAL
Status: COMPLETED | OUTPATIENT
Start: 2019-09-24 | End: 2019-09-24

## 2019-09-24 RX ORDER — ACETAMINOPHEN 325 MG/1
650 TABLET ORAL
Qty: 20 TAB | Refills: 0 | Status: SHIPPED | OUTPATIENT
Start: 2019-09-24

## 2019-09-24 RX ADMIN — ACETAMINOPHEN 1000 MG: 500 TABLET, FILM COATED ORAL at 10:06

## 2019-09-24 NOTE — ED PROVIDER NOTES
EMERGENCY DEPARTMENT HISTORY AND PHYSICAL EXAM      Date: 2019  Patient Name: Dominga Bonilla    History of Presenting Illness     Chief Complaint   Patient presents with    Foot Pain     History Provided By: Patient    HPI: Dominga Bonilla, 36 y.o. female with no chronic medical hx who presents ambulatory to the ED with cc of acute moderate aching left 5th toe pain x 3 days 2/2 to accidentally hitting her left foot against corner of sofa 3 days pta. Pain exacerbated with movement and ambulation. Denies taking any medication sofr symptoms. Denies numbness,tingling, lrom, focal weakness, bleeding/ wound. PCP: Florencio Schwartz NP    There are no other complaints, changes, or physical findings at this time. No current facility-administered medications on file prior to encounter. Current Outpatient Medications on File Prior to Encounter   Medication Sig Dispense Refill    ondansetron (ZOFRAN ODT) 4 mg disintegrating tablet Take 1 Tab by mouth every eight (8) hours as needed for Nausea. 15 Tab 0    [id number] fum-folic acid-dss 29 mg iron- 1 mg-25 mg tab Take 1 Tab by mouth daily. 20 Tab 0     Past History     Past Medical History:  Past Medical History:   Diagnosis Date    Kidney disorder      Past Surgical History:  Past Surgical History:   Procedure Laterality Date    HX  SECTION  2016    HX GYN      D and C     Family History:  History reviewed. No pertinent family history. Social History:  Social History     Tobacco Use    Smoking status: Current Some Day Smoker     Packs/day: 0.25     Types: Cigarettes    Smokeless tobacco: Never Used    Tobacco comment: Now smoking 1/2 black and milds - last was a wk ago   Substance Use Topics    Alcohol use: No    Drug use: No     Allergies: Allergies   Allergen Reactions    Soap Hives     Review of Systems   Review of Systems   Constitutional: Negative. Negative for chills, fatigue and fever. Respiratory: Negative.   Negative for cough and shortness of breath. Cardiovascular: Negative. Negative for chest pain, palpitations and leg swelling. Gastrointestinal: Negative. Negative for abdominal pain, diarrhea, nausea and vomiting. Genitourinary: Negative. Negative for difficulty urinating and dysuria. Musculoskeletal: Positive for arthralgias and joint swelling. Negative for back pain, myalgias, neck pain and neck stiffness. Skin: Negative. Negative for color change, rash and wound. Neurological: Negative. Negative for dizziness, numbness and headaches. Psychiatric/Behavioral: Negative. Physical Exam   Physical Exam   Constitutional: She is oriented to person, place, and time. She appears well-developed and well-nourished. No distress. HENT:   Head: Normocephalic and atraumatic. Right Ear: External ear normal.   Left Ear: External ear normal.   Nose: Nose normal.   Eyes: Pupils are equal, round, and reactive to light. Conjunctivae and EOM are normal.   Neck: Normal range of motion. Neck supple. Cardiovascular: Regular rhythm and intact distal pulses. Pulses:       Posterior tibial pulses are 2+ on the right side, and 2+ on the left side. Pulmonary/Chest: Effort normal.   Abdominal: Soft. Musculoskeletal: She exhibits edema and tenderness. She exhibits no deformity. Left ankle: Normal.        Right foot: Normal.        Left foot: There is tenderness, bony tenderness (Lt 5th toe) and swelling. There is normal range of motion, normal capillary refill, no crepitus, no deformity and no laceration. Neurological: She is alert and oriented to person, place, and time. Skin: Skin is warm and dry. No abrasion, no laceration and no rash noted. She is not diaphoretic. No erythema. Psychiatric: She has a normal mood and affect. Her behavior is normal. Judgment and thought content normal.   Nursing note and vitals reviewed.     Diagnostic Study Results   Labs -   No results found for this or any previous visit (from the past 12 hour(s)). Radiologic Studies -   XR FOOT LT MIN 3 V   Final Result   IMPRESSION: No acute abnormality. Xr Foot Lt Min 3 V    Result Date: 9/24/2019  IMPRESSION: No acute abnormality. Medical Decision Making   I am the first provider for this patient. I reviewed the vital signs, available nursing notes, past medical history, past surgical history, family history and social history. Vital Signs-Reviewed the patient's vital signs. Patient Vitals for the past 12 hrs:   Temp Pulse Resp BP SpO2   09/24/19 0917 98.2 °F (36.8 °C) 84 16 117/66 100 %     Pulse Oximetry Analysis - 100% on RA    Records Reviewed: Nursing Notes, Old Medical Records, Previous Radiology Studies and Previous Laboratory Studies    Provider Notes (Medical Decision Making):   Patient presents with Lt 5th toe pain after trauma. DDx: dislocation, fracture, contusion. Will get analgesics and xrays. Neurovasularly intact. ED Course:   Initial assessment performed. The patients presenting problems have been discussed, and they are in agreement with the care plan formulated and outlined with them. I have encouraged them to ask questions as they arise throughout their visit. Progress Note:   Updated pt on all returned results and findings. Discussed the importance of proper follow up as referred below along with return precautions. Pt in agreement with the care plan and expresses agreement with and understanding of all items discussed. Disposition:  10:34 AM  I have discussed with patient their diagnosis, treatment, and follow up plan. The patient agrees to follow up as outlined in discharge paperwork and also to return to the ED with any worsening. Jared Meckel, PA-C      PLAN:  1. Current Discharge Medication List      START taking these medications    Details   acetaminophen (TYLENOL) 325 mg tablet Take 2 Tabs by mouth every four (4) hours as needed for Pain.   Qty: 20 Tab, Refills: 0 CONTINUE these medications which have NOT CHANGED    Details   ondansetron (ZOFRAN ODT) 4 mg disintegrating tablet Take 1 Tab by mouth every eight (8) hours as needed for Nausea. Qty: 15 Tab, Refills: 0      [id number] fum-folic acid-dss 29 mg iron- 1 mg-25 mg tab Take 1 Tab by mouth daily. Qty: 20 Tab, Refills: 0           2. Follow-up Information     Follow up With Specialties Details Why Contact Info    Mikala Hinojosa DPM Podiatry Schedule an appointment as soon as possible for a visit in 2 days As needed, If symptoms worsen [address] [facility] [address]  [phone]          Return to ED if worse     Diagnosis     Clinical Impression:   1. Contusion of lesser toe of left foot without damage to nail, initial encounter            Please note that this dictation was completed with Dragon, computer voice recognition software. Quite often unanticipated grammatical, syntax, homophones, and other interpretive errors are inadvertently transcribed by the computer software. Please disregard these errors. Additionally, please excuse any errors that have escaped final proofreading.

## 2019-09-24 NOTE — ED NOTES
Patient presents to the ED with c/o left foot 5th digit pain x3 days. Pt reports accidentally kicking the bottom of the sofa. Pt reports swelling. Pt denies taking any pain medications. Pt is alert and oriented. Pt skin is warm and dry. Pt is ambulatory independently. Pt left foot 5th digit is swollen. Emergency Department Nursing Plan of Care       The Nursing Plan of Care is developed from the Nursing assessment and Emergency Department Attending provider initial evaluation. The plan of care may be reviewed in the ED Provider note.     The Plan of Care was developed with the following considerations:   Patient / Family readiness to learn indicated by:verbalized understanding  Persons(s) to be included in education: patient  Barriers to Learning/Limitations:No    Signed     Linda Lockhart    9/24/2019   10:12 AM

## 2019-09-24 NOTE — DISCHARGE INSTRUCTIONS
Patient Education        Learning About RICE (Rest, Ice, Compression, and Elevation)  What is RICE? RICE is a way to care for an injury. RICE helps relieve pain and swelling. It may also help with healing and flexibility. RICE stands for:  · Rest and protect the injured or sore area. · Ice or a cold pack used as soon as possible. · Compression, or wrapping the injured or sore area with an elastic bandage. · Elevation (propping up) the injured or sore area. How do you do RICE? You can use RICE for home treatment when you have general aches and pains or after an injury or surgery. Rest  · Do not put weight on the injury for at least 24 to 48 hours. · Use crutches for a badly sprained knee or ankle. · Support a sprained wrist, elbow, or shoulder with a sling. Ice  · Put ice or a cold pack on the injury right away to reduce pain and swelling. Frozen vegetables will also work as an ice pack. Put a thin cloth between the ice or cold pack and your skin. The cloth protects the injured area from getting too cold. · Use ice for 10 to 15 minutes at a time for the first 48 to 72 hours. Compression  · Use compression for sprains, strains, and surgeries of the arms and legs. · Wrap the injured area with an elastic bandage or compression sleeve to reduce swelling. · Don't wrap it too tightly. If the area below it feels numb, tingles, or feels cool, loosen the wrap. Elevation  · Use elevation for areas of the body that can be propped up, such as arms and legs. · Prop up the injured area on pillows whenever you use ice. Keep it propped up anytime you sit or lie down. · Try to keep the injured area at or above the level of your heart. This will help reduce swelling and bruising. Where can you learn more? Go to http://rl-lavon.info/. Enter Y104 in the search box to learn more about \"Learning About RICE (Rest, Ice, Compression, and Elevation). \"  Current as of: June 26, 2019  Content Version: 12.2  © 7692-4141 [employer]. Care instructions adapted under license by [employer] (which disclaims liability or warranty for this information). If you have questions about a medical condition or this instruction, always ask your healthcare professional. [address] any warranty or liability for your use of this information.

## 2020-06-29 ENCOUNTER — HOSPITAL ENCOUNTER (OUTPATIENT)
Dept: ULTRASOUND IMAGING | Age: 35
Discharge: HOME | End: 2020-06-29
Attending: PHYSICIAN ASSISTANT
Payer: SELF-PAY

## 2020-06-29 DIAGNOSIS — R80.8 NEPHROGENOUS PROTEINURIA: ICD-10-CM

## 2020-06-29 PROCEDURE — 76770 US EXAM ABDO BACK WALL COMP: CPT

## 2020-07-20 ENCOUNTER — HOSPITAL ENCOUNTER (EMERGENCY)
Age: 35
Discharge: LEFT BEFORE DISCHARGE | End: 2020-07-20
Attending: EMERGENCY MEDICINE
Payer: SELF-PAY

## 2020-07-20 PROCEDURE — 75810000275 HC EMERGENCY DEPT VISIT NO LEVEL OF CARE

## 2020-07-23 ENCOUNTER — HOSPITAL ENCOUNTER (EMERGENCY)
Age: 35
Discharge: HOME | End: 2020-07-23
Attending: EMERGENCY MEDICINE
Payer: COMMERCIAL

## 2020-07-23 ENCOUNTER — APPOINTMENT (OUTPATIENT)
Dept: CT IMAGING | Age: 35
End: 2020-07-23
Attending: EMERGENCY MEDICINE
Payer: COMMERCIAL

## 2020-07-23 VITALS
HEIGHT: 64 IN | WEIGHT: 110 LBS | SYSTOLIC BLOOD PRESSURE: 110 MMHG | BODY MASS INDEX: 18.78 KG/M2 | TEMPERATURE: 98.7 F | DIASTOLIC BLOOD PRESSURE: 65 MMHG | RESPIRATION RATE: 16 BRPM | OXYGEN SATURATION: 100 % | HEART RATE: 75 BPM

## 2020-07-23 DIAGNOSIS — R51.9 ACUTE NONINTRACTABLE HEADACHE, UNSPECIFIED HEADACHE TYPE: Primary | ICD-10-CM

## 2020-07-23 LAB
ANION GAP SERPL CALC-SCNC: 13 MMOL/L (ref 5–15)
BASOPHILS # BLD: 0 K/UL (ref 0–0.1)
BASOPHILS NFR BLD: 0 % (ref 0–1)
BUN SERPL-MCNC: 9 MG/DL (ref 6–20)
BUN/CREAT SERPL: 9 (ref 12–20)
CALCIUM SERPL-MCNC: 8.9 MG/DL (ref 8.5–10.1)
CHLORIDE SERPL-SCNC: 103 MMOL/L (ref 97–108)
CO2 SERPL-SCNC: 24 MMOL/L (ref 21–32)
CREAT SERPL-MCNC: 0.95 MG/DL (ref 0.55–1.02)
DIFFERENTIAL METHOD BLD: ABNORMAL
EOSINOPHIL # BLD: 0 K/UL (ref 0–0.4)
EOSINOPHIL NFR BLD: 0 % (ref 0–7)
ERYTHROCYTE [DISTWIDTH] IN BLOOD BY AUTOMATED COUNT: 14.6 % (ref 11.5–14.5)
GLUCOSE SERPL-MCNC: 131 MG/DL (ref 65–100)
HCT VFR BLD AUTO: 43.9 % (ref 35–47)
HGB BLD-MCNC: 14.3 G/DL (ref 11.5–16)
IMM GRANULOCYTES # BLD AUTO: 0 K/UL (ref 0–0.04)
IMM GRANULOCYTES NFR BLD AUTO: 0 % (ref 0–0.5)
LYMPHOCYTES # BLD: 1.8 K/UL (ref 0.8–3.5)
LYMPHOCYTES NFR BLD: 18 % (ref 12–49)
MCH RBC QN AUTO: 25.8 PG (ref 26–34)
MCHC RBC AUTO-ENTMCNC: 32.6 G/DL (ref 30–36.5)
MCV RBC AUTO: 79.2 FL (ref 80–99)
MONOCYTES # BLD: 0.9 K/UL (ref 0–1)
MONOCYTES NFR BLD: 9 % (ref 5–13)
NEUTS SEG # BLD: 7.4 K/UL (ref 1.8–8)
NEUTS SEG NFR BLD: 73 % (ref 32–75)
NRBC # BLD: 0 K/UL (ref 0–0.01)
NRBC BLD-RTO: 0 PER 100 WBC
PLATELET # BLD AUTO: 285 K/UL (ref 150–400)
PMV BLD AUTO: 10.2 FL (ref 8.9–12.9)
POTASSIUM SERPL-SCNC: 3.8 MMOL/L (ref 3.5–5.1)
RBC # BLD AUTO: 5.54 M/UL (ref 3.8–5.2)
SODIUM SERPL-SCNC: 140 MMOL/L (ref 136–145)
WBC # BLD AUTO: 10.1 K/UL (ref 3.6–11)

## 2020-07-23 PROCEDURE — 96361 HYDRATE IV INFUSION ADD-ON: CPT

## 2020-07-23 PROCEDURE — 99283 EMERGENCY DEPT VISIT LOW MDM: CPT

## 2020-07-23 PROCEDURE — 36415 COLL VENOUS BLD VENIPUNCTURE: CPT

## 2020-07-23 PROCEDURE — 96374 THER/PROPH/DIAG INJ IV PUSH: CPT

## 2020-07-23 PROCEDURE — 85025 COMPLETE CBC W/AUTO DIFF WBC: CPT

## 2020-07-23 PROCEDURE — 80048 BASIC METABOLIC PNL TOTAL CA: CPT

## 2020-07-23 PROCEDURE — 70450 CT HEAD/BRAIN W/O DYE: CPT

## 2020-07-23 PROCEDURE — 96375 TX/PRO/DX INJ NEW DRUG ADDON: CPT

## 2020-07-23 PROCEDURE — 74011250636 HC RX REV CODE- 250/636: Performed by: EMERGENCY MEDICINE

## 2020-07-23 RX ORDER — DIPHENHYDRAMINE HYDROCHLORIDE 50 MG/ML
25 INJECTION, SOLUTION INTRAMUSCULAR; INTRAVENOUS
Status: COMPLETED | OUTPATIENT
Start: 2020-07-23 | End: 2020-07-23

## 2020-07-23 RX ORDER — BUTALBITAL, ACETAMINOPHEN AND CAFFEINE 300; 40; 50 MG/1; MG/1; MG/1
1 CAPSULE ORAL
Qty: 12 CAP | Refills: 0 | Status: SHIPPED | OUTPATIENT
Start: 2020-07-23 | End: 2020-07-26

## 2020-07-23 RX ORDER — KETOROLAC TROMETHAMINE 30 MG/ML
15 INJECTION, SOLUTION INTRAMUSCULAR; INTRAVENOUS
Status: COMPLETED | OUTPATIENT
Start: 2020-07-23 | End: 2020-07-23

## 2020-07-23 RX ORDER — PROCHLORPERAZINE EDISYLATE 5 MG/ML
10 INJECTION INTRAMUSCULAR; INTRAVENOUS
Status: COMPLETED | OUTPATIENT
Start: 2020-07-23 | End: 2020-07-23

## 2020-07-23 RX ORDER — SODIUM CHLORIDE 0.9 % (FLUSH) 0.9 %
5-40 SYRINGE (ML) INJECTION EVERY 8 HOURS
Status: DISCONTINUED | OUTPATIENT
Start: 2020-07-23 | End: 2020-07-23 | Stop reason: HOSPADM

## 2020-07-23 RX ADMIN — KETOROLAC TROMETHAMINE 15 MG: 30 INJECTION, SOLUTION INTRAMUSCULAR; INTRAVENOUS at 07:34

## 2020-07-23 RX ADMIN — DIPHENHYDRAMINE HYDROCHLORIDE 25 MG: 50 INJECTION, SOLUTION INTRAMUSCULAR; INTRAVENOUS at 07:34

## 2020-07-23 RX ADMIN — PROCHLORPERAZINE EDISYLATE 10 MG: 5 INJECTION INTRAMUSCULAR; INTRAVENOUS at 07:34

## 2020-07-23 RX ADMIN — SODIUM CHLORIDE 1000 ML: 900 INJECTION, SOLUTION INTRAVENOUS at 07:34

## 2020-07-23 NOTE — ED NOTES
Pt reporting she feels much better. Marshall Lepe .Discharge summary and discharge medications reviewed with patient and appropriate educational materials and side effects teaching were provided. patient  Given 0 paper prescriptions and 1 electronic prescriptions sent to pt's listed pharmacy. Patient verbalized understanding of the importance of discussing medications with his or her physician or clinic they will be following up with. No si/s of acute distress prior to discharge. Patient offered wheelchair from treatment area to hospital entrance, patient declined wheelchair.

## 2020-07-23 NOTE — ED TRIAGE NOTES
CC neck pain with headache x 4 days reports, states no history of migraines. Has tried OTCs with no relief. With headache also c/o n/v and light sensitivity. VSS. Emergency Department Nursing Plan of Care       The Nursing Plan of Care is developed from the Nursing assessment and Emergency Department Attending provider initial evaluation. The plan of care may be reviewed in the ED Provider note.     The Plan of Care was developed with the following considerations:   Patient / Family readiness to learn indicated by:verbalized understanding  Persons(s) to be included in education: patient  Barriers to Learning/Limitations:No    Signed     Yogesh Mulligan RN    7/23/2020   7:21 AM

## 2020-07-23 NOTE — ED PROVIDER NOTES
EMERGENCY DEPARTMENT HISTORY AND PHYSICAL EXAM      Date: 2020  Patient Name: Randy Bingham    History of Presenting Illness     Chief Complaint   Patient presents with    Headache       History Provided By: Patient    HPI: Randy Bingham, 30 y.o. female presents to the ED with cc of 10/10, constant HA for ~ 4 days with associated nausea, vomiting, photophobia. Pt denies any hx of same symptoms. Pt mentions taking OTC medication for current symptoms with no relief. She is unsure of last menstrual cycle due to partial hysterectomy ~ 2 years ago. Pt states last vomiting episode was ~ 5 AM this morning. She denies any modifying factors. She specifically denies any fevers, chills, chest pain, shortness of breath, rash, diarrhea, sweating or weight loss. There are no other complaints, changes, or physical findings at this time. PCP: Yoandy, MD Diana    No current facility-administered medications on file prior to encounter. Current Outpatient Medications on File Prior to Encounter   Medication Sig Dispense Refill    acetaminophen (TYLENOL) 325 mg tablet Take 2 Tabs by mouth every four (4) hours as needed for Pain. 20 Tab 0    ondansetron (ZOFRAN ODT) 4 mg disintegrating tablet Take 1 Tab by mouth every eight (8) hours as needed for Nausea. 15 Tab 0    [id number] fum-folic acid-dss 29 mg iron- 1 mg-25 mg tab Take 1 Tab by mouth daily. 20 Tab 0       Past History     Past Medical History:  Past Medical History:   Diagnosis Date    Kidney disorder        Past Surgical History:  Past Surgical History:   Procedure Laterality Date    HX  SECTION  2016    HX GYN      D and C       Family History:  History reviewed. No pertinent family history.     Social History:  Social History     Tobacco Use    Smoking status: Current Some Day Smoker     Packs/day: 0.25     Types: Cigarettes    Smokeless tobacco: Never Used    Tobacco comment: Now smoking 1/2 black and milds - last was a wk ago Substance Use Topics    Alcohol use: No    Drug use: No       Allergies: Allergies   Allergen Reactions    Soap Hives         Review of Systems   Review of Systems   Constitutional: Negative for fever. HENT: Negative for sore throat. Eyes: Positive for photophobia. Negative for redness. Respiratory: Negative for shortness of breath and wheezing. Cardiovascular: Negative for chest pain and leg swelling. Gastrointestinal: Positive for nausea and vomiting. Negative for abdominal pain and blood in stool. Genitourinary: Negative for difficulty urinating, dysuria, hematuria, menstrual problem and vaginal bleeding. Musculoskeletal: Negative for back pain and joint swelling. Neurological: Positive for headaches. Negative for dizziness, seizures, syncope, speech difficulty, weakness and numbness. Hematological: Negative for adenopathy. Psychiatric/Behavioral: Negative for agitation, confusion and suicidal ideas. The patient is not nervous/anxious. Physical Exam   Physical Exam  Vitals signs and nursing note reviewed. Constitutional:       General: She is not in acute distress. Appearance: She is well-developed. HENT:      Head: Normocephalic and atraumatic. Mouth/Throat:      Pharynx: No oropharyngeal exudate. Eyes:      General:         Left eye: No discharge. Conjunctiva/sclera: Conjunctivae normal.      Pupils: Pupils are equal, round, and reactive to light. Neck:      Musculoskeletal: Full passive range of motion without pain, normal range of motion and neck supple. No neck rigidity. Vascular: No JVD. Comments: Neck is supple   Cardiovascular:      Rate and Rhythm: Normal rate and regular rhythm. Heart sounds: Normal heart sounds. Pulmonary:      Effort: Pulmonary effort is normal. No respiratory distress. Breath sounds: Normal breath sounds. No wheezing. Abdominal:      General: Bowel sounds are normal. There is no distension. Palpations: Abdomen is soft. Tenderness: There is no abdominal tenderness. There is no guarding or rebound. Musculoskeletal: Normal range of motion. General: No tenderness. Lymphadenopathy:      Cervical: No cervical adenopathy. Skin:     General: Skin is warm and dry. Findings: No rash. Neurological:      Mental Status: She is alert and oriented to person, place, and time. Cranial Nerves: No cranial nerve deficit. Deep Tendon Reflexes: Reflexes are normal and symmetric. Psychiatric:         Behavior: Behavior normal.         Diagnostic Study Results     Labs -     Recent Results (from the past 12 hour(s))   CBC WITH AUTOMATED DIFF    Collection Time: 07/23/20  7:29 AM   Result Value Ref Range    WBC 10.1 3.6 - 11.0 K/uL    RBC 5.54 (H) 3.80 - 5.20 M/uL    HGB 14.3 11.5 - 16.0 g/dL    HCT 43.9 35.0 - 47.0 %    MCV 79.2 (L) 80.0 - 99.0 FL    MCH 25.8 (L) 26.0 - 34.0 PG    MCHC 32.6 30.0 - 36.5 g/dL    RDW 14.6 (H) 11.5 - 14.5 %    PLATELET 276 641 - 880 K/uL    MPV 10.2 8.9 - 12.9 FL    NRBC 0.0 0  WBC    ABSOLUTE NRBC 0.00 0.00 - 0.01 K/uL    NEUTROPHILS 73 32 - 75 %    LYMPHOCYTES 18 12 - 49 %    MONOCYTES 9 5 - 13 %    EOSINOPHILS 0 0 - 7 %    BASOPHILS 0 0 - 1 %    IMMATURE GRANULOCYTES 0 0.0 - 0.5 %    ABS. NEUTROPHILS 7.4 1.8 - 8.0 K/UL    ABS. LYMPHOCYTES 1.8 0.8 - 3.5 K/UL    ABS. MONOCYTES 0.9 0.0 - 1.0 K/UL    ABS. EOSINOPHILS 0.0 0.0 - 0.4 K/UL    ABS. BASOPHILS 0.0 0.0 - 0.1 K/UL    ABS. IMM.  GRANS. 0.0 0.00 - 0.04 K/UL    DF AUTOMATED     METABOLIC PANEL, BASIC    Collection Time: 07/23/20  7:29 AM   Result Value Ref Range    Sodium 140 136 - 145 mmol/L    Potassium 3.8 3.5 - 5.1 mmol/L    Chloride 103 97 - 108 mmol/L    CO2 24 21 - 32 mmol/L    Anion gap 13 5 - 15 mmol/L    Glucose 131 (H) 65 - 100 mg/dL    BUN 9 6 - 20 MG/DL    Creatinine 0.95 0.55 - 1.02 MG/DL    BUN/Creatinine ratio 9 (L) 12 - 20      GFR est AA >60 >60 ml/min/1.73m2    GFR est non-AA >60 >60 ml/min/1.73m2    Calcium 8.9 8.5 - 10.1 MG/DL       Radiologic Studies -     CT Results  (Last 48 hours)               07/23/20 0758  CT HEAD WO CONT Final result    Impression:  IMPRESSION:    No acute abnormality. Narrative:  EXAM: CT HEAD WO CONT       INDICATION: headache       COMPARISON: None. CONTRAST: None. TECHNIQUE: Unenhanced CT of the head was performed using 5 mm images. Brain and   bone windows were generated. Coronal and sagittal reformats. CT dose reduction   was achieved through use of a standardized protocol tailored for this   examination and automatic exposure control for dose modulation. FINDINGS:   The ventricles and sulci are normal in size, shape and configuration. . There is   no significant white matter disease. There is no intracranial hemorrhage,   extra-axial collection, or mass effect. The basilar cisterns are open. No CT   evidence of acute infarct. The bone windows demonstrate no abnormalities. The visualized portions of the   paranasal sinuses and mastoid air cells are clear. Medical Decision Making   I am the first provider for this patient. I reviewed the vital signs, available nursing notes, past medical history, past surgical history, family history and social history. Vital Signs-Reviewed the patient's vital signs. Patient Vitals for the past 12 hrs:   Temp Pulse Resp BP SpO2   07/23/20 0742  76   100 %   07/23/20 0721 98.7 °F (37.1 °C) 87 16 110/65 100 %       Records Reviewed: Nursing Notes, Old Medical Records, Previous Radiology Studies and Previous Laboratory Studies    Provider Notes (Medical Decision Making):   Migraine HA, tension HA, sinusitis, meningitis, intracranial bleed. ED Course:   Initial assessment performed. The patients presenting problems have been discussed, and they are in agreement with the care plan formulated and outlined with them.   I have encouraged them to ask questions as they arise throughout their visit. ED Course as of Jul 23 0834   Thu Jul 23, 2020   0694 Pt re-evaluated, states she is feeling better. Will discharge. [CW]      ED Course User Index  [CW] Jackelyn Laron EDWARDS       Critical Care Time: 0    Disposition:  Discharge Note:  8:34 AM  The pt is ready for discharge. The pt's signs, symptoms, diagnosis, and discharge instructions have been discussed and pt has conveyed their understanding. The pt is to follow up as recommended or return to ER should their symptoms worsen. Plan has been discussed and pt is in agreement. DISCHARGE PLAN:  1. Current Discharge Medication List      START taking these medications    Details   butalbital-acetaminophen-caff (Fioricet) -40 mg per capsule Take 1 Cap by mouth every four (4) hours as needed for Headache for up to 3 days. Qty: 12 Cap, Refills: 0           2. Follow-up Information     Follow up With Specialties Details Why [address] [facility] EMERGENCY DEPT Emergency Medicine  If symptoms worsen Odell Alvarez        3. Return to ED if worse     Diagnosis     Clinical Impression:   1. Acute nonintractable headache, unspecified headache type        Attestations: This note is prepared by Anabel Martinez, acting as Scribe for  Esmeralda Krabbe, MD.     Esmeralda Krabbe, MD: The scribe's documentation has been prepared under my direction and personally reviewed by me in its entirety.  I confirm that the note above accurately reflects all work, treatment, procedures, and medical decision making performed by me

## 2020-07-24 ENCOUNTER — PATIENT OUTREACH (OUTPATIENT)
Dept: CASE MANAGEMENT | Age: 35
End: 2020-07-24

## 2020-11-03 ENCOUNTER — HOSPITAL ENCOUNTER (EMERGENCY)
Age: 35
Discharge: HOME | End: 2020-11-03
Attending: EMERGENCY MEDICINE
Payer: COMMERCIAL

## 2020-11-03 VITALS
OXYGEN SATURATION: 100 % | DIASTOLIC BLOOD PRESSURE: 76 MMHG | BODY MASS INDEX: 18.27 KG/M2 | SYSTOLIC BLOOD PRESSURE: 116 MMHG | WEIGHT: 107 LBS | TEMPERATURE: 98.7 F | HEART RATE: 80 BPM | RESPIRATION RATE: 16 BRPM | HEIGHT: 64 IN

## 2020-11-03 DIAGNOSIS — L42 PITYRIASIS ROSEA: ICD-10-CM

## 2020-11-03 DIAGNOSIS — J01.90 ACUTE BACTERIAL SINUSITIS: Primary | ICD-10-CM

## 2020-11-03 DIAGNOSIS — Z20.822 PERSON UNDER INVESTIGATION FOR COVID-19: ICD-10-CM

## 2020-11-03 DIAGNOSIS — B96.89 ACUTE BACTERIAL SINUSITIS: Primary | ICD-10-CM

## 2020-11-03 PROCEDURE — 87635 SARS-COV-2 COVID-19 AMP PRB: CPT

## 2020-11-03 PROCEDURE — 99282 EMERGENCY DEPT VISIT SF MDM: CPT

## 2020-11-03 RX ORDER — LORATADINE AND PSEUDOEPHEDRINE SULFATE 5; 120 MG/1; MG/1
1 TABLET, EXTENDED RELEASE ORAL 2 TIMES DAILY
Qty: 30 TAB | Refills: 0 | Status: SHIPPED | OUTPATIENT
Start: 2020-11-03

## 2020-11-03 RX ORDER — FLUTICASONE PROPIONATE 50 MCG
2 SPRAY, SUSPENSION (ML) NASAL DAILY
Qty: 1 BOTTLE | Refills: 0 | Status: SHIPPED | OUTPATIENT
Start: 2020-11-03

## 2020-11-03 RX ORDER — AMOXICILLIN AND CLAVULANATE POTASSIUM 875; 125 MG/1; MG/1
1 TABLET, FILM COATED ORAL 2 TIMES DAILY
Qty: 20 TAB | Refills: 0 | Status: SHIPPED | OUTPATIENT
Start: 2020-11-03

## 2020-11-03 NOTE — ED PROVIDER NOTES
EMERGENCY DEPARTMENT HISTORY AND PHYSICAL EXAM      Date: 11/3/2020  Patient Name: Lelan Fothergill    History of Presenting Illness     Chief Complaint   Patient presents with    Concern For COVID-19 (Coronavirus)    Rash     History Provided By: Patient    HPI: Lelan Fothergill, 30 y.o. female with medical history significant for tobacco abuse who presents via private vehicle to the ED with cc of acute moderate persistent nasal congestion, rhinorrhea, green mucus, postnasal drip, sinus pain and pressure, headache X 1 week. Endorses that symptoms were initially very bad, then they improved, and now they have worsened again. Endorses taking over-the-counter green pill that her boyfriend gave her to help with sinus congestion. Minimal relief. No medications prior to arrival today. Denies any known sick contacts. Endorse history of sinus infections. Endorses that her job is requesting to be tested for COVID-19. Denies fever, chills, nausea, vomiting, chest pain, shortness of breath, wheezing, neck pain or stiffness, lightheadedness, dizziness, ear pain, sore throat. Patient additionally endorses nonpruritic and nonpainful rash to abdominal region and trunk X \"weeks\". Endorses symptoms started with a herald patch to left upper quadrant abdomen, which grew in size until she started to notice new rash dispersed to trunk. No new environmental exposures, no one else in the household with a rash, no new detergents or cosmetics, no new medications. Denies any other associated symptoms with the rash. No medications or alleviating factors. PCP: Yoandy, MD Diana    There are no other complaints, changes, or physical findings at this time. No current facility-administered medications on file prior to encounter. Current Outpatient Medications on File Prior to Encounter   Medication Sig Dispense Refill    acetaminophen (TYLENOL) 325 mg tablet Take 2 Tabs by mouth every four (4) hours as needed for Pain.  21 Tab 0    ondansetron (ZOFRAN ODT) 4 mg disintegrating tablet Take 1 Tab by mouth every eight (8) hours as needed for Nausea. 15 Tab 0    [id number] fum-folic acid-dss 29 mg iron- 1 mg-25 mg tab Take 1 Tab by mouth daily. 20 Tab 0     Past History     Past Medical History:  Past Medical History:   Diagnosis Date    Kidney disorder      Past Surgical History:  Past Surgical History:   Procedure Laterality Date    HX  SECTION  2016    HX GYN      D and C     Family History:  History reviewed. No pertinent family history. Social History:  Social History     Tobacco Use    Smoking status: Current Some Day Smoker     Packs/day: 0.25     Types: Cigarettes    Smokeless tobacco: Never Used    Tobacco comment: Now smoking 1/2 black and milds - last was a wk ago   Substance Use Topics    Alcohol use: No    Drug use: No     Allergies: Allergies   Allergen Reactions    Soap Hives     Review of Systems   Review of Systems   Constitutional: Negative. Negative for activity change, appetite change, chills, diaphoresis, fatigue and fever. HENT: Positive for congestion, postnasal drip, rhinorrhea, sinus pressure and sinus pain. Negative for ear pain, facial swelling, mouth sores, sore throat, tinnitus, trouble swallowing and voice change. Eyes: Negative. Negative for photophobia, pain and visual disturbance. Respiratory: Positive for cough. Negative for chest tightness, shortness of breath and wheezing. Cardiovascular: Negative. Negative for chest pain, palpitations and leg swelling. Gastrointestinal: Negative. Negative for abdominal pain, constipation, diarrhea, nausea and vomiting. Genitourinary: Negative. Negative for difficulty urinating, dysuria, frequency, hematuria and urgency. Musculoskeletal: Negative. Negative for arthralgias, back pain, joint swelling, myalgias, neck pain and neck stiffness. Skin: Positive for rash. Negative for color change, pallor and wound. Neurological: Positive for headaches. Negative for dizziness, seizures, syncope, weakness, light-headedness and numbness. Psychiatric/Behavioral: Negative. Negative for confusion. The patient is not nervous/anxious. Physical Exam   Physical Exam  Vitals signs and nursing note reviewed. Constitutional:       General: She is not in acute distress. Appearance: Normal appearance. She is well-developed. She is not ill-appearing, toxic-appearing or diaphoretic. HENT:      Head: Normocephalic and atraumatic. Jaw: There is normal jaw occlusion. No trismus. Right Ear: Hearing, tympanic membrane, ear canal and external ear normal. There is no impacted cerumen. Left Ear: Hearing, tympanic membrane, ear canal and external ear normal. There is no impacted cerumen. Nose: Mucosal edema, congestion and rhinorrhea present. Right Sinus: Maxillary sinus tenderness present. No frontal sinus tenderness. Left Sinus: Maxillary sinus tenderness present. No frontal sinus tenderness. Mouth/Throat:      Lips: No lesions. Mouth: Mucous membranes are moist. No oral lesions or angioedema. Tongue: Tongue does not deviate from midline. Pharynx: Oropharynx is clear. Uvula midline. No pharyngeal swelling, oropharyngeal exudate, posterior oropharyngeal erythema or uvula swelling. Tonsils: No tonsillar exudate or tonsillar abscesses. Eyes:      Conjunctiva/sclera: Conjunctivae normal.      Pupils: Pupils are equal, round, and reactive to light. Neck:      Musculoskeletal: Full passive range of motion without pain and normal range of motion. Trachea: Trachea and phonation normal.   Cardiovascular:      Rate and Rhythm: Normal rate and regular rhythm. Heart sounds: Normal heart sounds. Pulmonary:      Effort: Pulmonary effort is normal. No tachypnea, accessory muscle usage or respiratory distress. Breath sounds: Normal breath sounds.  No decreased breath sounds, wheezing, rhonchi or rales. Abdominal:      General: Bowel sounds are normal. There is no distension. Palpations: Abdomen is soft. Abdomen is not rigid. Tenderness: There is no abdominal tenderness. There is no guarding or rebound. Negative signs include Rico's sign and McBurney's sign. Musculoskeletal: Normal range of motion. Skin:     General: Skin is warm and dry. Coloration: Skin is not pale. Findings: Rash present. Rash is macular. Comments: Macular, dry, dark/skin toned rash intermittently dispersed on patient's trunk. No vesicles, crusting, streaking, erythema, drainage, nodules, fluctuance, induration, tenderness to palpation, warmth. Neurological:      General: No focal deficit present. Mental Status: She is alert and oriented to person, place, and time. She is not disoriented. GCS: GCS eye subscore is 4. GCS verbal subscore is 5. GCS motor subscore is 6. Cranial Nerves: No cranial nerve deficit. Sensory: No sensory deficit. Motor: No tremor or seizure activity. Psychiatric:         Speech: Speech normal.         Behavior: Behavior normal.         Thought Content: Thought content normal.         Judgment: Judgment normal.       Diagnostic Study Results   Labs -     Recent Results (from the past 12 hour(s))   SARS-COV-2    Collection Time: 11/03/20 11:18 AM   Result Value Ref Range    Specimen source Nasopharyngeal      SARS-CoV-2 PENDING     SARS-CoV-2 PENDING     Specimen source Nasopharyngeal      COVID-19 rapid test PENDING     Specimen type NP Swab      Health status PENDING     COVID-19 PENDING        Radiologic Studies -   No orders to display     No results found. Medical Decision Making   I am the first provider for this patient. I reviewed the vital signs, available nursing notes, past medical history, past surgical history, family history and social history. Vital Signs-Reviewed the patient's vital signs.   Patient Vitals for the past 24 hrs:   Temp Pulse Resp BP SpO2   11/03/20 1110 98.7 °F (37.1 °C) 80 16 116/76 100 %     Pulse Oximetry Analysis - 100% on RA and normal    Records Reviewed: Nursing Notes, Old Medical Records, Previous Radiology Studies and Previous Laboratory Studies    Provider Notes (Medical Decision Making):   21-year-old female presents with nasal congestion, green rhinorrhea, postnasal drip, sinus pressure and pain X 1 week. Symptoms are consistent with bacterial sinusitis. Differential includes viral sinusitis, allergic rhinitis, other URI, COVID-19, flu. Plan to treat with antibiotics as well as obtain SARS-CoV-2 test today. Patient additionally endorses rash X \"weeks\". Differential includes pityriasis rosea, allergic reaction/drug eruption, tinea, candidiasis, cellulitis. ED Course:   Initial assessment performed. The patients presenting problems have been discussed, and they are in agreement with the care plan formulated and outlined with them. I have encouraged them to ask questions as they arise throughout their visit. Progress Note:   Updated pt on all returned results and findings. Discussed the importance of proper follow up as referred below along with return precautions. Pt in agreement with the care plan and expresses agreement with and understanding of all items discussed. Disposition:  11:24 AM  I have discussed with patient their diagnosis, treatment, and follow up plan. The patient agrees to follow up as outlined in discharge paperwork and also to return to the ED with any worsening. Joel Fay PA-C      PLAN:  1. Current Discharge Medication List      START taking these medications    Details   amoxicillin-clavulanate (Augmentin) 875-125 mg per tablet Take 1 Tab by mouth two (2) times a day. Qty: 20 Tab, Refills: 0      loratadine-pseudoephedrine (Claritin-D 12 Hour) 5-120 mg per tablet Take 1 Tab by mouth two (2) times a day.   Qty: 30 Tab, Refills: 0      fluticasone propionate (FLONASE) 50 mcg/actuation nasal spray 2 Sprays by Both Nostrils route daily. Qty: 1 Bottle, Refills: 0         CONTINUE these medications which have NOT CHANGED    Details   acetaminophen (TYLENOL) 325 mg tablet Take 2 Tabs by mouth every four (4) hours as needed for Pain. Qty: 20 Tab, Refills: 0      ondansetron (ZOFRAN ODT) 4 mg disintegrating tablet Take 1 Tab by mouth every eight (8) hours as needed for Nausea. Qty: 15 Tab, Refills: 0      KNZ600-BWWS fum-folic acid-dss 29 mg iron- 1 mg-25 mg tab Take 1 Tab by mouth daily. Qty: 20 Tab, Refills: 0           2. Follow-up Information     Follow up With Specialties Details Why [address] [facility] EMERGENCY DEPT Emergency Medicine Go to As needed, If symptoms worsen [address] [address]    [address] [facility] Internal Medicine Schedule an appointment as soon as possible for a visit in 1 week As needed, If symptoms worsen [address]  [phone]        Return to ED if worse     Diagnosis     Clinical Impression:   1. Acute bacterial sinusitis    2. Person under investigation for COVID-19    3. Pityriasis rosea            Please note that this dictation was completed with Dragon, computer voice recognition software. Quite often unanticipated grammatical, syntax, homophones, and other interpretive errors are inadvertently transcribed by the computer software. Please disregard these errors. Additionally, please excuse any errors that have escaped final proofreading.

## 2020-11-03 NOTE — ED TRIAGE NOTES
CC dry cough, lack of appetite, and congestion x 1 week. Also c/o rash. Emergency Department Nursing Plan of Care       The Nursing Plan of Care is developed from the Nursing assessment and Emergency Department Attending provider initial evaluation. The plan of care may be reviewed in the ED Provider note.     The Plan of Care was developed with the following considerations:   Patient / Family readiness to learn indicated by:verbalized understanding  Persons(s) to be included in education: patient  Barriers to Learning/Limitations:No    Signed     Elaina Martin RN    11/3/2020   11:10 AM

## 2020-11-03 NOTE — LETTER
[facility] EMERGENCY DEPT  [address] [address] [phone]    Work/School Note    Date: 11/3/2020     To Whom It May concern:    Lenora Rodriguez was evaulated by the following provider(s):  Attending Provider: Giovanni York MD  Physician Assistant: Lisa Choudhury virus is suspected. Per the CDC guidelines we recommend home isolation until the following conditions are all met:    1. At least 10 days have passed since symptoms first appeared and  2. At least 24 hours have passed since last fever without the use of fever-reducing medications and  3.  Symptoms (e.g., cough, shortness of breath) have improved    Sincerely,          Roxana Oneil PA-C

## 2020-11-03 NOTE — DISCHARGE INSTRUCTIONS
Patient Education        Pityriasis Rosea: Care Instructions  Your Care Instructions     Pityriasis rosea (say \"pit-uh-RY-uh-marychuy WILFREDO-zee-uh\") is a common skin rash. It usually starts as one scaly, reddish-pink spot on your stomach or back. Days or weeks later, more spots appear. The rash may itch, but it will not spread to other people. No one knows what causes pityriasis rosea. Some doctors believe it is a reaction to a virus. Pityriasis rosea is most common in children and young adults. It lasts 1 to 3 months and then goes away on its own. Medicine can help relieve any itching. Follow-up care is a key part of your treatment and safety. Be sure to make and go to all appointments, and call your doctor if you are having problems. It's also a good idea to know your test results and keep a list of the medicines you take. How can you care for yourself at home? · Use your medicine exactly as prescribed. Call your doctor if you have any problems with your medicine. · Expose your skin to small amounts of sunlight, but avoid sunburn. Sunlight can lessen the rash. · Use a mild soap, such as Dove or Cetaphil, when you wash your skin. · Add a handful of oatmeal (ground to a powder) to your bath. Or you can try an oatmeal bath product, such as Aveeno. Keep the water warm or lukewarm. A hot bath or shower may make the rash more visible and itchy. · Use an over-the-counter 1% hydrocortisone cream for small itchy areas. When should you call for help? Call your doctor now or seek immediate medical care if:    · You have signs of infection such as:  ? Pain, warmth, or swelling near the rash. ? Red streaks near the rash. ? Pus coming from the rash. ? A fever. Watch closely for changes in your health, and be sure to contact your doctor if:    · You see the rash on the palms of your hands or the soles of your feet.     · You do not get better as expected. Where can you learn more?   Go to http://www.gray.com/  Enter S327 in the search box to learn more about \"Pityriasis Rosea: Care Instructions. \"  Current as of: July 2, 2020               Content Version: 12.6  © 2006-2020 [employer]. Care instructions adapted under license by [employer] (which disclaims liability or warranty for this information). If you have questions about a medical condition or this instruction, always ask your healthcare professional. [address] any warranty or liability for your use of this information. Patient Education        Sinusitis: Care Instructions  Your Care Instructions     Sinusitis is an infection of the lining of the sinus cavities in your head. Sinusitis often follows a cold. It causes pain and pressure in your head and face. In most cases, sinusitis gets better on its own in 1 to 2 weeks. But some mild symptoms may last for several weeks. Sometimes antibiotics are needed. Follow-up care is a key part of your treatment and safety. Be sure to make and go to all appointments, and call your doctor if you are having problems. It's also a good idea to know your test results and keep a list of the medicines you take. How can you care for yourself at home? · Take an over-the-counter pain medicine, such as acetaminophen (Tylenol), ibuprofen (Advil, Motrin), or naproxen (Aleve). Read and follow all instructions on the label. · If the doctor prescribed antibiotics, take them as directed. Do not stop taking them just because you feel better. You need to take the full course of antibiotics. · Be careful when taking over-the-counter cold or flu medicines and Tylenol at the same time. Many of these medicines have acetaminophen, which is Tylenol. Read the labels to make sure that you are not taking more than the recommended dose. Too much acetaminophen (Tylenol) can be harmful.   · Breathe warm, moist air from a steamy shower, a hot bath, or a sink filled with hot water. Avoid cold, dry air. Using a humidifier in your home may help. Follow the directions for cleaning the machine. · Use saline (saltwater) nasal washes to help keep your nasal passages open and wash out mucus and bacteria. You can buy saline nose drops at a grocery store or drugstore. Or you can make your own at home by adding 1 teaspoon of salt and 1 teaspoon of baking soda to 2 cups of distilled water. If you make your own, fill a bulb syringe with the solution, insert the tip into your nostril, and squeeze gently. Edwardo Lien your nose. · Put a hot, wet towel or a warm gel pack on your face 3 or 4 times a day for 5 to 10 minutes each time. · Try a decongestant nasal spray like oxymetazoline (Afrin). Do not use it for more than 3 days in a row. Using it for more than 3 days can make your congestion worse. When should you call for help? Call your doctor now or seek immediate medical care if:    · You have new or worse swelling or redness in your face or around your eyes.     · You have a new or higher fever. Watch closely for changes in your health, and be sure to contact your doctor if:    · You have new or worse facial pain.     · The mucus from your nose becomes thicker (like pus) or has new blood in it.     · You are not getting better as expected. Where can you learn more? Go to http://www.gray.com/  Enter O195 in the search box to learn more about \"Sinusitis: Care Instructions. \"  Current as of: April 15, 2020               Content Version: 12.6  © 7881-9200 [employer]. Care instructions adapted under license by [employer] (which disclaims liability or warranty for this information). If you have questions about a medical condition or this instruction, always ask your healthcare professional. [address] any warranty or liability for your use of this information.

## 2020-11-04 ENCOUNTER — PATIENT OUTREACH (OUTPATIENT)
Dept: CASE MANAGEMENT | Age: 35
End: 2020-11-04

## 2020-11-04 NOTE — PROGRESS NOTES
Patient contacted regarding COVID-19  risk. Discussed COVID-19 related testing which was pending at this time. default/yes/free text:[id number]::\"yes\"}. Outreach made within 2 business days of discharge: Yes    Care Transition Nurse/ Ambulatory Care Manager/ LPN Care Coordinator contacted the patient by telephone to perform post discharge assessment. Verified name and  with patient as identifiers. Provided introduction to self, and explanation of the CTN/ACM/LPN role, and reason for call due to risk factors for infection and/or exposure to COVID-19. Symptoms reviewed with patient who verbalized the following symptoms: fatigue, cough, no new symptoms and no worsening symptoms. Due to no new or worsening symptoms encounter was not routed to provider for escalation. Discussed follow-up appointments. If no appointment was previously scheduled, appointment scheduling offered: yes  [facility] follow up appointment(s): No future appointments. Non-[facility] follow up appointment(s): n/a      Advance Care Planning:   Does patient have an Advance Directive: not discussed this call. Patient has following risk factors of: no known risk factors. CTN/ACM/LPN reviewed discharge instructions, medical action plan and red flags such as increased shortness of breath, increasing fever and signs of decompensation with patient who verbalized understanding. Discussed exposure protocols and quarantine with CDC Guidelines What to do if you are sick with coronavirus disease .  Patient was given an opportunity for questions and concerns. The patient agrees to contact the [facility] exposure line [phone], [facility] department [facility] [address]  ([phone]) and PCP office for questions related to their healthcare. CTN/ACM provided contact information for future needs. Reviewed and educated patient on any new and changed medications related to discharge diagnosis.     Patient/family/caregiver given information for Tricia Mcconnell and agrees to enroll yes  Patient's preferred e-mail:  [email or web address]  Patient's preferred phone number: n/a  Based on Loop alert triggers, patient will be contacted by nurse care manager for worsening symptoms. Pt will be further monitored by COVID Loop Team based on severity of symptoms and risk factors.

## 2020-11-05 LAB
COVID-19, XGCOVT: NOT DETECTED
HEALTH STATUS, XMCV2T: NORMAL
SOURCE, COVRS: NORMAL
SPECIMEN SOURCE, FCOV2M: NORMAL
SPECIMEN TYPE, XMCV1T: NORMAL

## 2022-03-19 PROBLEM — N89.8 VAGINAL CYST: Status: ACTIVE | Noted: 2017-03-16

## 2022-03-20 PROBLEM — R10.11 RUQ ABDOMINAL PAIN: Status: ACTIVE | Noted: 2017-03-16

## 2022-03-20 PROBLEM — Z34.90 PREGNANCY: Status: ACTIVE | Noted: 2017-12-26

## 2023-05-23 RX ORDER — ONDANSETRON 4 MG/1
4 TABLET, ORALLY DISINTEGRATING ORAL EVERY 8 HOURS PRN
COMMUNITY
Start: 2017-12-08

## 2023-05-23 RX ORDER — AMOXICILLIN AND CLAVULANATE POTASSIUM 875; 125 MG/1; MG/1
1 TABLET, FILM COATED ORAL 2 TIMES DAILY
COMMUNITY
Start: 2020-11-03

## 2023-05-23 RX ORDER — LORATADINE AND PSEUDOEPHEDRINE SULFATE 5; 120 MG/1; MG/1
1 TABLET, EXTENDED RELEASE ORAL 2 TIMES DAILY
COMMUNITY
Start: 2020-11-03

## 2023-05-23 RX ORDER — FLUTICASONE PROPIONATE 50 MCG
2 SPRAY, SUSPENSION (ML) NASAL DAILY
COMMUNITY
Start: 2020-11-03

## 2023-05-23 RX ORDER — ACETAMINOPHEN 325 MG/1
650 TABLET ORAL EVERY 4 HOURS PRN
COMMUNITY
Start: 2019-09-24

## 2024-01-01 NOTE — ED NOTES
Discharge instructions were given to the patient by Yumiko Manzano RN. Patient was given no prescriptions and was encouraged to call or return to the ED for worsening issues or problems and was encouraged to schedule a follow up appointment for continuing care. Patient given a current medication reconciliation form and verbalized understanding of their medications and importance of discussing medications at follow-up. The patient verbalized understanding of discharge instructions and prescriptions, all questions were answered. The patient has no further concerns at this time. Patient stable at time of discharge. The patient left the Emergency Department ambulatory, with self, and in no acute distress. Patient declined wheelchair transport out of Emergency Department. Not Available